# Patient Record
Sex: FEMALE | Race: WHITE | Employment: OTHER | URBAN - METROPOLITAN AREA
[De-identification: names, ages, dates, MRNs, and addresses within clinical notes are randomized per-mention and may not be internally consistent; named-entity substitution may affect disease eponyms.]

---

## 2015-10-26 DEVICE — PORT INFUS 8FR PLAS ATTCH OPN END POLYUR CATH SIL FILL SUT
Type: IMPLANTABLE DEVICE | Site: CHEST | Status: NON-FUNCTIONAL
Removed: 2019-09-17

## 2017-01-17 ENCOUNTER — OFFICE VISIT (OUTPATIENT)
Dept: ONCOLOGY | Age: 72
End: 2017-01-17

## 2017-01-17 ENCOUNTER — HOSPITAL ENCOUNTER (OUTPATIENT)
Dept: LAB | Age: 72
Discharge: HOME | End: 2017-01-17
Payer: MEDICARE

## 2017-01-17 ENCOUNTER — DOCUMENTATION ONLY (OUTPATIENT)
Dept: ONCOLOGY | Age: 72
End: 2017-01-17

## 2017-01-17 VITALS
WEIGHT: 208 LBS | SYSTOLIC BLOOD PRESSURE: 155 MMHG | TEMPERATURE: 96.1 F | BODY MASS INDEX: 33.43 KG/M2 | HEART RATE: 86 BPM | OXYGEN SATURATION: 95 % | RESPIRATION RATE: 18 BRPM | DIASTOLIC BLOOD PRESSURE: 75 MMHG | HEIGHT: 66 IN

## 2017-01-17 DIAGNOSIS — C56.9 OVARIAN CANCER, UNSPECIFIED LATERALITY (HCC): Primary | ICD-10-CM

## 2017-01-17 DIAGNOSIS — C56.9 OVARIAN CANCER, UNSPECIFIED LATERALITY (HCC): ICD-10-CM

## 2017-01-17 LAB
ALBUMIN SERPL BCP-MCNC: 2.8 G/DL (ref 3.4–5)
ALBUMIN/GLOB SERPL: 0.8 {RATIO} (ref 0.8–1.7)
ALP SERPL-CCNC: 108 U/L (ref 45–117)
ALT SERPL-CCNC: 19 U/L (ref 13–56)
ANION GAP BLD CALC-SCNC: 11 MMOL/L (ref 3–18)
AST SERPL W P-5'-P-CCNC: 14 U/L (ref 15–37)
BASOPHILS # BLD AUTO: 0 K/UL (ref 0–0.06)
BASOPHILS # BLD: 0 % (ref 0–2)
BILIRUB SERPL-MCNC: 0.2 MG/DL (ref 0.2–1)
BUN SERPL-MCNC: 14 MG/DL (ref 7–18)
BUN/CREAT SERPL: 16 (ref 12–20)
CALCIUM SERPL-MCNC: 8.7 MG/DL (ref 8.5–10.1)
CHLORIDE SERPL-SCNC: 101 MMOL/L (ref 100–108)
CO2 SERPL-SCNC: 27 MMOL/L (ref 21–32)
CREAT SERPL-MCNC: 0.9 MG/DL (ref 0.6–1.3)
DIFFERENTIAL METHOD BLD: ABNORMAL
EOSINOPHIL # BLD: 0.1 K/UL (ref 0–0.4)
EOSINOPHIL NFR BLD: 1 % (ref 0–5)
ERYTHROCYTE [DISTWIDTH] IN BLOOD BY AUTOMATED COUNT: 14.2 % (ref 11.6–14.5)
GLOBULIN SER CALC-MCNC: 3.3 G/DL (ref 2–4)
GLUCOSE SERPL-MCNC: 93 MG/DL (ref 74–99)
HCT VFR BLD AUTO: 31.5 % (ref 35–45)
HGB BLD-MCNC: 10 G/DL (ref 12–16)
LYMPHOCYTES # BLD AUTO: 10 % (ref 21–52)
LYMPHOCYTES # BLD: 1 K/UL (ref 0.9–3.6)
MCH RBC QN AUTO: 27.3 PG (ref 24–34)
MCHC RBC AUTO-ENTMCNC: 31.7 G/DL (ref 31–37)
MCV RBC AUTO: 86.1 FL (ref 74–97)
MONOCYTES # BLD: 0.6 K/UL (ref 0.05–1.2)
MONOCYTES NFR BLD AUTO: 7 % (ref 3–10)
NEUTS SEG # BLD: 8 K/UL (ref 1.8–8)
NEUTS SEG NFR BLD AUTO: 82 % (ref 40–73)
PLATELET # BLD AUTO: 629 K/UL (ref 135–420)
PMV BLD AUTO: 9 FL (ref 9.2–11.8)
POTASSIUM SERPL-SCNC: 4.5 MMOL/L (ref 3.5–5.5)
PROT SERPL-MCNC: 6.1 G/DL (ref 6.4–8.2)
RBC # BLD AUTO: 3.66 M/UL (ref 4.2–5.3)
SODIUM SERPL-SCNC: 139 MMOL/L (ref 136–145)
WBC # BLD AUTO: 9.7 K/UL (ref 4.6–13.2)

## 2017-01-17 PROCEDURE — 80053 COMPREHEN METABOLIC PANEL: CPT | Performed by: OBSTETRICS & GYNECOLOGY

## 2017-01-17 PROCEDURE — 85025 COMPLETE CBC W/AUTO DIFF WBC: CPT | Performed by: OBSTETRICS & GYNECOLOGY

## 2017-01-17 PROCEDURE — 86304 IMMUNOASSAY TUMOR CA 125: CPT | Performed by: OBSTETRICS & GYNECOLOGY

## 2017-01-17 PROCEDURE — 36415 COLL VENOUS BLD VENIPUNCTURE: CPT | Performed by: OBSTETRICS & GYNECOLOGY

## 2017-01-17 NOTE — PROGRESS NOTES
[facility]  [address] [place] [address], [address] [facility], [address] [place], [place] [address] [phone]        Patient ID:  Name: Khalif Demarco  MRM: [id number]  : 1945/71 y.o. Date: 2017    SUBJECTIVE:  This is a 78 y.o.   female with Stage IIB, Grade 2 Endometrioid Ovarian Cancer s/p adjuvant chemotherapy with Carboplatin and Taxol s/p 6 cycles. Patient is here today for a 3 month f/u. Patient denies any Gyn symptoms. Patient specifically denies any abnormal vaginal bleeding, vaginal discharge, or vaginal irritation. Patient also denies abdominal pain, pelvic pain, or abdominal bloating. Patient is voiding without difficulty and bowel movements are regular. Patient is status post diagnostic laparoscopy converted to exploratory laparotomy, total abdominal hysterectomy, bilateral salpingo-oophorectomy, radical resection of pelvic mass with gastrocolic omentectomy, bilateral pelvic and periaortic lymph node dissection, optimal debulking of pelvic tumor and washings 2015. Post op course was complicated by necrotizing wound infection requiring debridement x 2 in the operating room. Patient has had a breast biopsy c/w hyaline cartilage favor chondroma.      Pt has been having night sweats, weight loss, loss of appetite over last month but recently has been feeling better      Labs:  Component      Latest Ref Rng & Units 2016          10:00 AM   Cancer Ag (CA) 125      0.0 - 38.1 U/mL 29.3     Component      Latest Ref Rng 10/4/2016          11:20 AM   Cancer Ag (CA) 125      0.0 - 38.1 U/mL 19.1     Component       Cancer Ag (CA) 125   Latest Ref Rng       0.0 - 38.1 U/mL   2016      11:00 AM 18.6   2016      11:15 AM 19.5     Component       Cancer Ag (CA) 125   Latest Ref Rng       0.0 - 34.0 U/mL   2016      8:37 AM 18.5     Component       Cancer Ag (CA) 125   Latest Ref Rng       0.0 - 34.0 U/mL   2016      8:15 AM 19.0   1/6/2016      8:20 AM 17.9   12/16/2015      8:35 AM 17.1   11/25/2015      8:20 AM 15.9   11/4/2015      8:25 AM 11.8         Infusion:  Chemotherapy Flowsheet 2/23/2016 2/2/2016 1/6/2016   Cycle C6 D1 C5D1 C4D1   Date 2/23/2016 2/2/2016 1/6/2016   Drug / Regimen Taxol/Carboplatin Taxol/Carbo Taxol/Carbp   Dosage 260 mg/333 mg see mar see mar   Pre Hydration  ml 250 250   Post Hydration  250 250   Pre Meds see mar see mar see mar   Notes ninoska well       Chemotherapy Flowsheet 12/16/2015 11/25/2015 11/4/2015   Cycle C3D1 C2D1 C1D1   Date 12/16/2015 11/25/2015 11/4/2015   Drug / Regimen Taxol/ Carbo Taxol/ Carbo Taxol/Carbo   Dosage See MAR  [phone]   Pre Meds   see mar         Imaging:    US TRANSVAGINAL: 12/30/16    Impression   Impression:        1. The patient is status post hysterectomy and bilateral oophorectomy.       2. There is a large (approximately 9.5 x 6.6 x 6.6 cm) cystic mass in the right adnexal region. No blood flow is identified within this finding.       Differential diagnostic considerations include a recurrent ovarian neoplasm, a postoperative seroma, or lymphocele. Mild anterolateral mural nodularity is questioned along the anterior aspect of this lesion on the prior CT scan. Further evaluation with pre-and postcontrast MR imaging of the pelvis should be considered.       A copy of this report will be faxed to the ordering clinician's office and the office staff will be notified of the incoming fax by telephone. POSITRON EMISSION TOMOGRAPHY/COMPUTED TOMOGRAPHY:         INDICATION: Ovarian carcinoma.    Subsequent treatment strategy.          RADIOPHARMACEUTICAL: 14.4 mCi F-18 FDG i.v.   Right antecubital injection site.     SERUM GLUCOSE: 91 mg/dL.          COMPARISON PET/CT: None.       CORRELATIVE ANATOMIC IMAGING: CT chest, abdomen, pelvis 3/14/2016.        TECHNIQUE: Initially, F-18 FDG was administered intravenously.  After  approximately 00-76 minutes, helically acquired CT was obtained from the skull  base to mid thighs with subsequent PET images, both obtained during normal tidal  respiration. The PET and CT images were reconstructed in the axial, coronal and  sagittal planes and viewed in a co-registered fashion. CT images are not  performed using standard diagnostic CT protocol, limited by lack of intravenous  iodinated contrast, reduced mAs and kVp (x-rays), and acquisition during normal  tidal respiratory motion (to improve fusion with PET images, however, could make  size assessment of abnormalities less accurate).           -----PET FINDINGS-----        Reference value mediastinal blood pool SUV (max) =  3.0.  -Reference value liver parenchyma SUV (max) =  4.5.     Skull base/Neck: No evidence of malignant glucose activity.          Chest: There is mild FDG uptake within a 1.0 x 1.9 cm left axillary lymph node;  SUV (max) =  2.8. This lymph node has a prominent fatty hilum. The significance  of this finding is unclear. This may represent reactive adenopathy although  metastatic disease cannot be excluded with certainty.     There are no other foci of malignant FDG uptake in the chest.         Abdomen/Pelvis: There is mild FDG uptake within a 1.1 cm diameter left  retroperitoneal lymph node; SUV (max) =  2.3 potentially representing metastatic  disease.     There are no other foci of malignant FDG uptake in the abdomen or pelvis. There  is no evidence of elevated FDG uptake within the 6.1 x 2.2 cm cystic lesion  previously noted in the right hemipelvis.        Osseous structures: Bone scintigraphy is more sensitive for detection of  osteoblastic metastatic disease and PET more sensitive for osteolytic lesions,  suggesting the imaging modalities should be regarded as complementary, and  should not replace each other. Otherwise, no evidence of malignant glucose  activity.          -----ADDITIONAL CT FINDINGS-----     Exam is limited due to lack of intravenous contrast.         CT Skull base/Neck: No significant focal abnormality on this noncontrast study.     No enlarged adenopathy.     CT Chest: Abnormalities are detailed above. Otherwise no significant interval  change since the recent CT dated 3/14/2016         CT Abdomen/Pelvis: No new finding since the recent CT dated 3/14/2016.     CT Osseous structures: No significant focal abnormality.        Impression:      1. Mild FDG uptake within a small left retroperitoneal lymph node potentially  representing metastatic neoplasm. 2. Mild nonspecific FDG uptake within a left axillary lymph node. 3. No other foci of malignant FDG uptake      Her pathology revealed: 8/12/15  A: LYMPH NODE, RIGHT PELVIC, BIOPSY:  SMALL LYMPHOCYTIC LYMPHOMA (CHRONIC LYMPHOCYTIC LEUKEMIA). NEGATIVE FOR METASTATIC ADENOCARCINOMA. B: RIGHT PELVIC MASS: ADENOCARCINOMA. SPECIMEN: RIGHT OVARY. PROCEDURE: RIGHT OOPHORECTOMY. LYMPH NODE SAMPLING: PERFORMED. SPECIMEN INTEGRITY: RIGHT OVARY CAPSULE RUPTURED. PRIMARY TUMOR SITE: RIGHT OVARY. OVARIAN SURFACE INVOLVEMENT: ABSENT.  TUMOR SIZE: 11.0 CM GREATEST DIMENSION. HISTOLOGIC TYPE: ENDOMETRIOID CARCINOMA. HISTOLOGIC GRADE: MODERATELY DIFFERENTIATED (G2). IMPLANTS: N/A  EXTENT OF INVOLVEMENT OF OTHER TISSUE/ORGANS: NONE IDENTIFIED. LYMPH/VASCULAR INVASION: PRESENT. PATHOLOGIC STAGE: pT1c, N0, M0 (FIGO IC). NUMBER OF LYMPH NODES EXAMINED: 19.  NUMBER OF LYMPH NODES INVOLVED: 0.  C: RIGHT PELVIC LYMPH NODE:  SMALL LYMPHOCYTIC LYMPHOMA. NEGATIVE FOR METASTATIC ADENOCARCINOMA. D: LEFT SALPINGO-OOPHORECTOMY:  ENDOMETRIOSIS AND ADHESIONS. NEGATIVE FOR MALIGNANCY. E: PELVIC MASS, BIOPSY:  BENIGN ADHESIONS AND PERITONEAL TISSUE. NEGATIVE FOR MALIGNANCY. F: TOTAL HYSTERECTOMY:  CERVIX: NO ABNORMALITY SEEN.  ENDOMETRIUM: ATROPHIC. MYOMETRIUM: SUBMUCOSAL AND INTRAMURAL LEIOMYOMAS. SEROSA: ENDOMETRIOSIS WITH ADHESIONS. NEGATIVE FOR MALIGNANCY.   G: OMENTUM, RESECTION:  FOAMY MACROPHAGES AND HISTIOCYTES. NEGATIVE FOR MALIGNANCY. H: LYMPH NODES, RIGHT PARA-AORTIC, DISSECTION:  SMALL LYMPHOCYTIC LYMPHOMA. NEGATIVE FOR METASTATIC ADENOCARCINOMA (FOUR NODES). I: LYMPH NODES, LEFT PARA-AORTIC, DISSECTION:  SMALL LYMPHOCYTIC LYMPHOMA. NEGATIVE FOR METASTATIC ADENOCARCINOMA (TWO NODES). J: LYMPH NODES, LEFT PELVIC, DISSECTION:  SMALL LYMHOCYTIC LYMPHOMA. NEGATIVE FOR METASTATIC ADENOCARCINOMA (FIVE NODES). K: LYMPH NODES, RIGHT PELVIC, DISSECTION:    SMALL LYMPHOCYTIC LYMPHOMA. NEGATIVE FOR METASTATIC ADENOCARCINOMA (SIX NODES). Medications:     Current Outpatient Prescriptions on File Prior to Visit   Medication Sig Dispense Refill    pyridoxine, vitamin B6, (VITAMIN B-6) 100 mg tablet Take 100 mg by mouth daily.  cyanocobalamin 1,000 mcg tablet Take 500 mcg by mouth daily.  lidocaine-prilocaine (EMLA) topical cream Apply 1 g to affected area as needed for Pain. Place a dime amount over port site 1 hour prior to having port accessed. Cover with saran wrap. 30 g 3    naproxen (NAPROSYN) 500 mg tablet Take 500 mg by mouth two (2) times daily as needed.  levothyroxine (SYNTHROID) 175 mcg tablet Take 175 mcg by mouth Daily (before breakfast). Take dos  Indications: HYPOTHYROIDISM      acetaminophen (TYLENOL) 325 mg tablet Take 650 mg by mouth every four (4) hours as needed for Pain. No current facility-administered medications on file prior to visit. Allergies:      Allergies   Allergen Reactions    Other Plant, Animal, Environmental Swelling     Surgical steel earrings, and surgical clamps caused swelling and / or infection    Penicillins Other (comments)     Psychological reaction       OBJECTIVE:    Vitals:   Visit Vitals    /75 (BP 1 Location: Right arm, BP Patient Position: Sitting)    Pulse 86    Temp 96.1 °F (35.6 °C) (Oral)    Resp 18    Ht 5' 5.5\" (1.664 m)    Wt 94.3 kg (208 lb)    SpO2 95%    BMI 34.09 kg/m2       Physical Examination:  Physical Exam  VITAL SIGNS: Visit Vitals    /75 (BP 1 Location: Right arm, BP Patient Position: Sitting)    Pulse 86    Temp 96.1 °F (35.6 °C) (Oral)    Resp 18    Ht 5' 5.5\" (1.664 m)    Wt 94.3 kg (208 lb)    SpO2 95%    BMI 34.09 kg/m2          GENERAL KRISTINE: in no apparent distress and well developed and well nourished   Breast deferred     MUSCULOSKEL: no joint tenderness, deformity or swelling   INTEGUMENT:  warm and dry, no rashes or lesions   ABDOMEN . soft, NT, ND, No masses appreciated   EXTREMITIES: extremities normal, atraumatic, no cyanosis or edema   PELVIC: deferred   RECTAL: deferred   MANUEL SURVEY: Cervical, supraclavicular, axillary and inguinal nodes normal.   NEURO: Grossly normal         IMPRESSION/PLAN:  Stage IIB G2 endometrioid ovarian cancer who is clinically NGUYEN  s/p adjuvant Carboplatin and Taxol status post 6 cycles  Postoperative course complicated by necrotizing wound infection s/p debridement x2 now healed well. Reviewed CT and ultrasound c/w lymphocyst and is stable in size   done at [facility]: 38; will repeat today and if continues to be elevated may consider repeat PETCT  Will recheck labs today and call  Health maintenance- explained that breast exam does not replace need for mammogram; encourage her to call PCP to set up  Reviewed surveillance strategy at this point with exams,  every 3 months  Port flush every 6 weeks   Chronic lymphocytic lymphoma. Has f/u on Thursday with Dr. Catherine Oconnell at [facility]     Total time spent was 40 minutes, >50% of this time was spent counseling regarding clinical findings, chemotherapy associated symptoms and management and coordination of care.      Jeovanny Perry MD  Gynecologic Oncology  1/17/2017/1:52 PM

## 2017-01-17 NOTE — MR AVS SNAPSHOT
Visit Information Date & Time Provider Department Dept. Phone Encounter #  
 1/17/2017 11:45 AM MD lCaudia Neil [facility] [phone] [id number] Your Appointments 4/18/2017  1:45 PM  
Office Visit with MD Claudia Neil [facility]) Appt Note: 3mo f/u  
 [address] Upcoming Health Maintenance Date Due DTaP/Tdap/Td series (1 - Tdap) 12/27/1965 FOBT Q 1 YEAR AGE 50-75 12/27/1994 ZOSTER VACCINE AGE 60> 12/27/2004 GLAUCOMA SCREENING Q2Y 12/27/2009 OSTEOPOROSIS SCREENING (DEXA) 12/27/2009 MEDICARE YEARLY EXAM 12/27/2009 INFLUENZA AGE 9 TO ADULT 8/1/2016 Pneumococcal 65+ High/Highest Risk (2 of 2 - PCV13) 8/17/2016 BREAST CANCER SCRN MAMMOGRAM 10/7/2017 Allergies as of 1/17/2017  Review Complete On: 1/17/2017 By: Margy Carias MD  
  
 Severity Noted Reaction Type Reactions Other Plant, Animal, Environmental  10/01/2015   Side Effect Swelling Surgical steel earrings, and surgical clamps caused swelling and / or infection Penicillins  08/07/2015    Other (comments) Psychological reaction Current Immunizations  Reviewed on 10/4/2016 Name Date Influenza Vaccine 10/26/2015 Pneumococcal Polysaccharide (PPSV-23) 8/17/2015 11:11 AM  
  
 Not reviewed this visit You Were Diagnosed With   
  
 Codes Comments [facility]varian cancer, unspecified [facility]ality [facility]    -  Primary ICD-10-CM: C56.9 ICD-9-CM: 183. 0 Vitals BP Pulse Temp Resp Height(growth percentile) Weight(growth percentile) 155/75 (BP 1 Location: Right arm, BP Patient Position: Sitting) 86 96.1 °F (35.6 °C) (Oral) 18 5' 5.5\" (1.664 m) 208 lb (94.3 kg) SpO2 BMI OB Status Smoking Status 95% 34.09 kg/m2 Hysterectomy Passive Smoke Exposure - Never Smoker Vitals History BMI and BSA Data Body Mass Index Body Surface Area 34.09 kg/m 2 2.09 m 2 Preferred Pharmacy Pharmacy Name Phone [facility] [address] [place] [address] Emma [phone] Your Updated Medication List  
  
   
This list is accurate as of: 1/17/17 12:29 PM.  Always use your most recent med list.  
  
  
  
  
 cyanocobalamin 1,000 mcg tablet Take 500 mcg by mouth daily. lidocaine-prilocaine topical cream  
Commonly known as:  EMLA Apply 1 g to affected area as needed for Pain. Place a dime amount over port site 1 hour prior to having port accessed. Cover with saran wrap. NAPROSYN 500 mg tablet Generic drug:  naproxen Take 500 mg by mouth two (2) times daily as needed. pyridoxine (vitamin B6) 100 mg tablet Commonly known as:  VITAMIN B-6 Take 100 mg by mouth daily. SYNTHROID 175 mcg tablet Generic drug:  levothyroxine Take 175 mcg by mouth Daily (before breakfast). Take dos  Indications: HYPOTHYROIDISM TYLENOL 325 mg tablet Generic drug:  acetaminophen Take 650 mg by mouth every four (4) hours as needed for Pain. To-Do List   
 01/17/2017 Lab:  CANCER ANTIGEN 125   
  
 01/17/2017 Lab:  CBC WITH AUTOMATED DIFF   
  
 01/17/2017 Lab:  METABOLIC PANEL, COMPREHENSIVE   
  
 01/31/2017 10:30 AM  
  Appointment with [facility] FAST TRACK NURSE at [facility] [address] [facility] ([phone]) Introducing [facility]! Thony Herrera introduces [employer] patient portal. Now you can access parts of your medical record, email your doctor's office, and request medication refills online. 1. In your internet browser, go to [email or web address] 2. Click on the First Time User? Click Here link in the Sign In box. You will see the New Member Sign Up page. 3. Enter your [employer] Access Code exactly as it appears below. You will not need to use this code after youve completed the sign-up process. If you do not sign up before the expiration date, you must request a new code. · [employer] Access Code: [email or web address] Expires: 4/17/2017 12:29 PM 
 
4. Enter the last four digits of your Social Security Number (xxxx) and Date of Birth (mm/dd/yyyy) as indicated and click Submit. You will be taken to the next sign-up page. 5. Create a [employer] ID. This will be your [employer] login ID and cannot be changed, so think of one that is secure and easy to remember. 6. Create a [employer] password. You can change your password at any time. 7. Enter your Password Reset Question and Answer. This can be used at a later time if you forget your password. 8. Enter your e-mail address. You will receive e-mail notification when new information is available in [address] 9. Click Sign Up. You can now view and download portions of your medical record. 10. Click the Download Summary menu link to download a portable copy of your medical information. If you have questions, please visit the Frequently Asked Questions section of the [employer] website. Remember, [employer] is NOT to be used for urgent needs. For medical emergencies, dial 911. Now available from your iPhone and Android! Please provide this summary of care documentation to your next provider. Your primary care clinician is listed as Darvin Campbell. If you have any questions after today's visit, please call [phone].

## 2017-01-18 LAB — CANCER AG125 SERPL-ACNC: 36.5 U/ML (ref 0–38.1)

## 2017-01-31 ENCOUNTER — HOSPITAL ENCOUNTER (OUTPATIENT)
Dept: INFUSION THERAPY | Age: 72
Discharge: HOME | End: 2017-01-31
Payer: MEDICARE

## 2017-01-31 VITALS
SYSTOLIC BLOOD PRESSURE: 134 MMHG | RESPIRATION RATE: 18 BRPM | HEART RATE: 71 BPM | OXYGEN SATURATION: 96 % | DIASTOLIC BLOOD PRESSURE: 63 MMHG | TEMPERATURE: 97 F

## 2017-01-31 PROCEDURE — 77030012965 HC NDL HUBR BBMI -A

## 2017-01-31 PROCEDURE — 74011250636 HC RX REV CODE- 250/636: Performed by: OBSTETRICS & GYNECOLOGY

## 2017-01-31 PROCEDURE — 96523 IRRIG DRUG DELIVERY DEVICE: CPT

## 2017-01-31 RX ORDER — HEPARIN 100 UNIT/ML
500 SYRINGE INTRAVENOUS AS NEEDED
Status: DISCONTINUED | OUTPATIENT
Start: 2017-01-31 | End: 2017-02-04 | Stop reason: HOSPADM

## 2017-01-31 RX ORDER — SODIUM CHLORIDE 0.9 % (FLUSH) 0.9 %
10-40 SYRINGE (ML) INJECTION AS NEEDED
Status: DISCONTINUED | OUTPATIENT
Start: 2017-01-31 | End: 2017-02-04 | Stop reason: HOSPADM

## 2017-01-31 RX ADMIN — Medication 500 UNITS: at 11:00

## 2017-01-31 RX ADMIN — Medication 20 ML: at 11:00

## 2017-02-07 ENCOUNTER — APPOINTMENT (OUTPATIENT)
Dept: INFUSION THERAPY | Age: 72
End: 2017-02-07
Payer: MEDICARE

## 2017-03-14 ENCOUNTER — HOSPITAL ENCOUNTER (OUTPATIENT)
Dept: INFUSION THERAPY | Age: 72
Discharge: HOME | End: 2017-03-14
Payer: MEDICARE

## 2017-03-14 VITALS
WEIGHT: 218.4 LBS | RESPIRATION RATE: 18 BRPM | BODY MASS INDEX: 35.79 KG/M2 | SYSTOLIC BLOOD PRESSURE: 146 MMHG | OXYGEN SATURATION: 98 % | TEMPERATURE: 97.5 F | DIASTOLIC BLOOD PRESSURE: 62 MMHG | HEART RATE: 80 BPM

## 2017-03-14 LAB
ALBUMIN SERPL BCP-MCNC: 4 G/DL (ref 3.4–5)
ALBUMIN/GLOB SERPL: 1.3 {RATIO} (ref 0.8–1.7)
ALP SERPL-CCNC: 116 U/L (ref 45–117)
ALT SERPL-CCNC: 24 U/L (ref 13–56)
ANION GAP BLD CALC-SCNC: 7 MMOL/L (ref 3–18)
AST SERPL W P-5'-P-CCNC: 18 U/L (ref 15–37)
BASOPHILS # BLD AUTO: 0 K/UL (ref 0–0.06)
BASOPHILS # BLD: 0 % (ref 0–2)
BILIRUB SERPL-MCNC: 0.3 MG/DL (ref 0.2–1)
BUN SERPL-MCNC: 14 MG/DL (ref 7–18)
BUN/CREAT SERPL: 18 (ref 12–20)
CALCIUM SERPL-MCNC: 9.1 MG/DL (ref 8.5–10.1)
CHLORIDE SERPL-SCNC: 104 MMOL/L (ref 100–108)
CO2 SERPL-SCNC: 29 MMOL/L (ref 21–32)
CREAT SERPL-MCNC: 0.79 MG/DL (ref 0.6–1.3)
DIFFERENTIAL METHOD BLD: ABNORMAL
EOSINOPHIL # BLD: 0.3 K/UL (ref 0–0.4)
EOSINOPHIL NFR BLD: 4 % (ref 0–5)
ERYTHROCYTE [DISTWIDTH] IN BLOOD BY AUTOMATED COUNT: 16.1 % (ref 11.6–14.5)
GLOBULIN SER CALC-MCNC: 3 G/DL (ref 2–4)
GLUCOSE SERPL-MCNC: 83 MG/DL (ref 74–99)
HCT VFR BLD AUTO: 36 % (ref 35–45)
HGB BLD-MCNC: 11.5 G/DL (ref 12–16)
LYMPHOCYTES # BLD AUTO: 16 % (ref 21–52)
LYMPHOCYTES # BLD: 1.3 K/UL (ref 0.9–3.6)
MAGNESIUM SERPL-MCNC: 1.5 MG/DL (ref 1.8–2.4)
MCH RBC QN AUTO: 27.6 PG (ref 24–34)
MCHC RBC AUTO-ENTMCNC: 31.9 G/DL (ref 31–37)
MCV RBC AUTO: 86.3 FL (ref 74–97)
MONOCYTES # BLD: 0.7 K/UL (ref 0.05–1.2)
MONOCYTES NFR BLD AUTO: 8 % (ref 3–10)
NEUTS SEG # BLD: 5.8 K/UL (ref 1.8–8)
NEUTS SEG NFR BLD AUTO: 72 % (ref 40–73)
PLATELET # BLD AUTO: 374 K/UL (ref 135–420)
PMV BLD AUTO: 9.4 FL (ref 9.2–11.8)
POTASSIUM SERPL-SCNC: 4.1 MMOL/L (ref 3.5–5.5)
PROT SERPL-MCNC: 7 G/DL (ref 6.4–8.2)
RBC # BLD AUTO: 4.17 M/UL (ref 4.2–5.3)
SODIUM SERPL-SCNC: 140 MMOL/L (ref 136–145)
WBC # BLD AUTO: 8.1 K/UL (ref 4.6–13.2)

## 2017-03-14 PROCEDURE — 80053 COMPREHEN METABOLIC PANEL: CPT | Performed by: OBSTETRICS & GYNECOLOGY

## 2017-03-14 PROCEDURE — 74011250636 HC RX REV CODE- 250/636: Performed by: OBSTETRICS & GYNECOLOGY

## 2017-03-14 PROCEDURE — 86304 IMMUNOASSAY TUMOR CA 125: CPT | Performed by: OBSTETRICS & GYNECOLOGY

## 2017-03-14 PROCEDURE — 36591 DRAW BLOOD OFF VENOUS DEVICE: CPT

## 2017-03-14 PROCEDURE — 85025 COMPLETE CBC W/AUTO DIFF WBC: CPT | Performed by: OBSTETRICS & GYNECOLOGY

## 2017-03-14 PROCEDURE — 83735 ASSAY OF MAGNESIUM: CPT | Performed by: OBSTETRICS & GYNECOLOGY

## 2017-03-14 PROCEDURE — 77030012965 HC NDL HUBR BBMI -A

## 2017-03-14 RX ORDER — SODIUM CHLORIDE 0.9 % (FLUSH) 0.9 %
10-40 SYRINGE (ML) INJECTION AS NEEDED
Status: DISCONTINUED | OUTPATIENT
Start: 2017-03-14 | End: 2017-03-18 | Stop reason: HOSPADM

## 2017-03-14 RX ORDER — HEPARIN 100 UNIT/ML
500 SYRINGE INTRAVENOUS ONCE
Status: COMPLETED | OUTPATIENT
Start: 2017-03-14 | End: 2017-03-14

## 2017-03-14 RX ADMIN — Medication 500 UNITS: at 10:52

## 2017-03-14 RX ADMIN — Medication 30 ML: at 10:52

## 2017-03-14 NOTE — PROGRESS NOTES
[facility] Progress Note    Date: 2017    Name: Billy Vela    MRN: [id number]         : 1945    Q 6 weeks Port flush & Lab Draw    Ms. Nanci Norwood to [facility], ambulatory with cane, at 1030 accompanied by her . Pt was assessed and education was provided. She states she has been doing well and denies complaints today. Ms. Katie Quinones vitals were reviewed. Visit Vitals    /62 (BP 1 Location: Right arm, BP Patient Position: Sitting)    Pulse 80    Temp 97.5 °F (36.4 °C)    Resp 18    Wt 99.1 kg (218 lb 6.4 oz)    SpO2 98%    Breastfeeding No    BMI 35.79 kg/m2       Left chest mediport was accessed with 20 gauge 3/4 inch العراقي(s) after chloroprep. Port flushed easily and had brisk blood return. Blood drawn off and sent to lab for CBC, CMP, Mg and CA-125 after 10 ml waste per written orders. Recent Results (from the past 12 hour(s))   CBC WITH AUTOMATED DIFF    Collection Time: 17 10:52 AM   Result Value Ref Range    WBC 8.1 4.6 - 13.2 K/uL    RBC 4.17 (L) 4.20 - 5.30 M/uL    HGB 11.5 (L) 12.0 - 16.0 g/dL    HCT 36.0 35.0 - 45.0 %    MCV 86.3 74.0 - 97.0 FL    MCH 27.6 24.0 - 34.0 PG    MCHC 31.9 31.0 - 37.0 g/dL    RDW 16.1 (H) 11.6 - 14.5 %    PLATELET 487 888 - 037 K/uL    MPV 9.4 9.2 - 11.8 FL    NEUTROPHILS 72 40 - 73 %    LYMPHOCYTES 16 (L) 21 - 52 %    MONOCYTES 8 3 - 10 %    EOSINOPHILS 4 0 - 5 %    BASOPHILS 0 0 - 2 %    ABS. NEUTROPHILS 5.8 1.8 - 8.0 K/UL    ABS. LYMPHOCYTES 1.3 0.9 - 3.6 K/UL    ABS. MONOCYTES 0.7 0.05 - 1.2 K/UL    ABS. EOSINOPHILS 0.3 0.0 - 0.4 K/UL    ABS.  BASOPHILS 0.0 0.0 - 0.06 K/UL    DF AUTOMATED     MAGNESIUM    Collection Time: 17 10:52 AM   Result Value Ref Range    Magnesium 1.5 (L) 1.8 - 2.4 mg/dL   METABOLIC PANEL, COMPREHENSIVE    Collection Time: 17 10:52 AM   Result Value Ref Range    Sodium 140 136 - 145 mmol/L    Potassium 4.1 3.5 - 5.5 mmol/L    Chloride 104 100 - 108 mmol/L    CO2 29 21 - 32 mmol/L    Anion gap 7 3.0 - 18 mmol/L    Glucose 83 74 - 99 mg/dL    BUN 14 7.0 - 18 MG/DL    Creatinine 0.79 0.6 - 1.3 MG/DL    BUN/Creatinine ratio 18 12 - 20      GFR est AA >60 >60 ml/min/1.73m2    GFR est non-AA >60 >60 ml/min/1.73m2    Calcium 9.1 8.5 - 10.1 MG/DL    Bilirubin, total 0.3 0.2 - 1.0 MG/DL    ALT (SGPT) 24 13 - 56 U/L    AST (SGOT) 18 15 - 37 U/L    Alk. phosphatase 116 45 - 117 U/L    Protein, total 7.0 6.4 - 8.2 g/dL    Albumin 4.0 3.4 - 5.0 g/dL    Globulin 3.0 2.0 - 4.0 g/dL    A-G Ratio 1.3 0.8 - 1.7         Mediport flushed with NS 20 ml and Heparin 500 units then de-accessed. No irritation or bleeding noted. Band-Aid applied. Ms. Jaja Márquez tolerated the procedure, and had no complaints. Patient armband removed and shredded. Ms. Jaja Márquez was discharged from [place] [address] in stable condition at 1055. She is to return on 4/25/17 at 56 for her next appointment.     Mariya Tena RN  March 14, 2017

## 2017-03-15 ENCOUNTER — TELEPHONE (OUTPATIENT)
Dept: ONCOLOGY | Age: 72
End: 2017-03-15

## 2017-03-15 LAB — CANCER AG125 SERPL-ACNC: 18.3 U/ML (ref 0–38.1)

## 2017-03-15 NOTE — TELEPHONE ENCOUNTER
Called to review recent lab results. All WNL, except mag 1.5. Patient feeling well. Denies complaints. All questions answered, patient demonstrates understanding and denies additional questions or concerns. Advised to call office with any questions or concerns before next appointment.

## 2017-04-18 ENCOUNTER — OFFICE VISIT (OUTPATIENT)
Dept: ONCOLOGY | Age: 72
End: 2017-04-18

## 2017-04-18 VITALS
SYSTOLIC BLOOD PRESSURE: 151 MMHG | WEIGHT: 219.8 LBS | RESPIRATION RATE: 16 BRPM | HEART RATE: 76 BPM | OXYGEN SATURATION: 97 % | TEMPERATURE: 96.2 F | BODY MASS INDEX: 36.02 KG/M2 | DIASTOLIC BLOOD PRESSURE: 71 MMHG

## 2017-04-18 DIAGNOSIS — C56.9 OVARIAN CANCER, UNSPECIFIED LATERALITY (HCC): Primary | ICD-10-CM

## 2017-04-18 NOTE — PROGRESS NOTES
[facility]  [address], [address] [facility]   [phone]        Patient ID:  Name: Sushma Antunez  MRM: [id number]  : 1945/71 y.o. Date: 2017    SUBJECTIVE:  This is a 78 y.o.   female with Stage IIB, Grade 2 Endometrioid Ovarian Cancer s/p adjuvant chemotherapy with Carboplatin and Taxol s/p 6 cycles. Patient is here today for a 3 month f/u. Patient denies any Gyn symptoms. Patient specifically denies any abnormal vaginal bleeding, vaginal discharge, or vaginal irritation. Patient also denies abdominal pain, pelvic pain, or abdominal bloating. Patient is voiding without difficulty and bowel movements are regular. Patient is status post diagnostic laparoscopy converted to exploratory laparotomy, total abdominal hysterectomy, bilateral salpingo-oophorectomy, radical resection of pelvic mass with gastrocolic omentectomy, bilateral pelvic and periaortic lymph node dissection, optimal debulking of pelvic tumor and washings 2015. Post op course was complicated by necrotizing wound infection requiring debridement x 2 in the operating room. Patient has had a breast biopsy c/w hyaline cartilage favor chondroma. Pt is still having a little numbness on bottom of feet. Denies N/V, abdominopelvic pain, change in bladder or bowel.        Labs:  Component      Latest Ref Rng & Units 3/14/2017          10:52 AM   Cancer Ag (CA) 125      0.0 - 38.1 U/mL 18.3     Component      Latest Ref Rng & Units 2016          10:00 AM   Cancer Ag (CA) 125      0.0 - 38.1 U/mL 29.3     Component      Latest Ref Rng 10/4/2016          11:20 AM   Cancer Ag (CA) 125      0.0 - 38.1 U/mL 19.1     Component       Cancer Ag (CA) 125   Latest Ref Rng       0.0 - 38.1 U/mL   2016      11:00 AM 18.6   2016      11:15 AM 19.5     Component       Cancer Ag (CA) 125   Latest Ref Rng       0.0 - 34.0 U/mL   2016 8: 37 AM 18.5     Component       Cancer Ag (CA) 125   Latest Ref Rng       0.0 - 34.0 U/mL   2/2/2016      8:15 AM 19.0   1/6/2016      8:20 AM 17.9   12/16/2015      8:35 AM 17.1   11/25/2015      8:20 AM 15.9   11/4/2015      8:25 AM 11.8         Infusion:  Chemotherapy Flowsheet 2/23/2016 2/2/2016 1/6/2016   Cycle C6 D1 C5D1 C4D1   Date 2/23/2016 2/2/2016 1/6/2016   Drug / Regimen Taxol/Carboplatin Taxol/Carbo Taxol/Carbp   Dosage 260 mg/333 mg see mar see mar   Pre Hydration  ml 250 250   Post Hydration  250 250   Pre Meds see mar see mar see mar   Notes ninoska well       Chemotherapy Flowsheet 12/16/2015 11/25/2015 11/4/2015   Cycle C3D1 C2D1 C1D1   Date 12/16/2015 11/25/2015 11/4/2015   Drug / Regimen Taxol/ Carbo Taxol/ Carbo Taxol/Carbo   Dosage See MAR  [phone]   Pre Meds   see mar         Imaging:    US TRANSVAGINAL: 12/30/16    Impression   Impression:        1. The patient is status post hysterectomy and bilateral oophorectomy.       2. There is a large (approximately 9.5 x 6.6 x 6.6 cm) cystic mass in the right adnexal region. No blood flow is identified within this finding.       Differential diagnostic considerations include a recurrent ovarian neoplasm, a postoperative seroma, or lymphocele. Mild anterolateral mural nodularity is questioned along the anterior aspect of this lesion on the prior CT scan. Further evaluation with pre-and postcontrast MR imaging of the pelvis should be considered.       A copy of this report will be faxed to the ordering clinician's office and the office staff will be notified of the incoming fax by telephone. POSITRON EMISSION TOMOGRAPHY/COMPUTED TOMOGRAPHY:    3/29/2017     INDICATION: Ovarian carcinoma.    Subsequent treatment strategy.          RADIOPHARMACEUTICAL: 14.4 mCi F-18 FDG i.v.   Right antecubital injection site.     SERUM GLUCOSE: 91 mg/dL.          COMPARISON PET/CT: None.       CORRELATIVE ANATOMIC IMAGING: CT chest, abdomen, pelvis 3/14/2016.        TECHNIQUE: Initially, F-18 FDG was administered intravenously. After  approximately 11-39 minutes, helically acquired CT was obtained from the skull  base to mid thighs with subsequent PET images, both obtained during normal tidal  respiration. The PET and CT images were reconstructed in the axial, coronal and  sagittal planes and viewed in a co-registered fashion. CT images are not  performed using standard diagnostic CT protocol, limited by lack of intravenous  iodinated contrast, reduced mAs and kVp (x-rays), and acquisition during normal  tidal respiratory motion (to improve fusion with PET images, however, could make  size assessment of abnormalities less accurate).           -----PET FINDINGS-----        Reference value mediastinal blood pool SUV (max) =  3.0.  -Reference value liver parenchyma SUV (max) =  4.5.     Skull base/Neck: No evidence of malignant glucose activity.          Chest: There is mild FDG uptake within a 1.0 x 1.9 cm left axillary lymph node;  SUV (max) =  2.8. This lymph node has a prominent fatty hilum. The significance  of this finding is unclear. This may represent reactive adenopathy although  metastatic disease cannot be excluded with certainty.     There are no other foci of malignant FDG uptake in the chest.         Abdomen/Pelvis: There is mild FDG uptake within a 1.1 cm diameter left  retroperitoneal lymph node; SUV (max) =  2.3 potentially representing metastatic  disease.     There are no other foci of malignant FDG uptake in the abdomen or pelvis. There  is no evidence of elevated FDG uptake within the 6.1 x 2.2 cm cystic lesion  previously noted in the right hemipelvis.        Osseous structures: Bone scintigraphy is more sensitive for detection of  osteoblastic metastatic disease and PET more sensitive for osteolytic lesions,  suggesting the imaging modalities should be regarded as complementary, and  should not replace each other.  Otherwise, no evidence of malignant glucose  activity.          -----ADDITIONAL CT FINDINGS-----     Exam is limited due to lack of intravenous contrast.         CT Skull base/Neck: No significant focal abnormality on this noncontrast study.     No enlarged adenopathy.     CT Chest: Abnormalities are detailed above. Otherwise no significant interval  change since the recent CT dated 3/14/2016         CT Abdomen/Pelvis: No new finding since the recent CT dated 3/14/2016.     CT Osseous structures: No significant focal abnormality.        Impression:      1. Mild FDG uptake within a small left retroperitoneal lymph node potentially  representing metastatic neoplasm. 2. Mild nonspecific FDG uptake within a left axillary lymph node. 3. No other foci of malignant FDG uptake      Her pathology revealed: 8/12/15  A: LYMPH NODE, RIGHT PELVIC, BIOPSY:  SMALL LYMPHOCYTIC LYMPHOMA (CHRONIC LYMPHOCYTIC LEUKEMIA). NEGATIVE FOR METASTATIC ADENOCARCINOMA. B: RIGHT PELVIC MASS: ADENOCARCINOMA. SPECIMEN: RIGHT OVARY. PROCEDURE: RIGHT OOPHORECTOMY. LYMPH NODE SAMPLING: PERFORMED. SPECIMEN INTEGRITY: RIGHT OVARY CAPSULE RUPTURED. PRIMARY TUMOR SITE: RIGHT OVARY. OVARIAN SURFACE INVOLVEMENT: ABSENT.  TUMOR SIZE: 11.0 CM GREATEST DIMENSION. HISTOLOGIC TYPE: ENDOMETRIOID CARCINOMA. HISTOLOGIC GRADE: MODERATELY DIFFERENTIATED (G2). IMPLANTS: N/A  EXTENT OF INVOLVEMENT OF OTHER TISSUE/ORGANS: NONE IDENTIFIED. LYMPH/VASCULAR INVASION: PRESENT. PATHOLOGIC STAGE: pT1c, N0, M0 (FIGO IC). NUMBER OF LYMPH NODES EXAMINED: 19.  NUMBER OF LYMPH NODES INVOLVED: 0.  C: RIGHT PELVIC LYMPH NODE:  SMALL LYMPHOCYTIC LYMPHOMA. NEGATIVE FOR METASTATIC ADENOCARCINOMA. D: LEFT SALPINGO-OOPHORECTOMY:  ENDOMETRIOSIS AND ADHESIONS. NEGATIVE FOR MALIGNANCY. E: PELVIC MASS, BIOPSY:  BENIGN ADHESIONS AND PERITONEAL TISSUE. NEGATIVE FOR MALIGNANCY. F: TOTAL HYSTERECTOMY:  CERVIX: NO ABNORMALITY SEEN.  ENDOMETRIUM: ATROPHIC.   MYOMETRIUM: SUBMUCOSAL AND INTRAMURAL LEIOMYOMAS. SEROSA: ENDOMETRIOSIS WITH ADHESIONS. NEGATIVE FOR MALIGNANCY. G: OMENTUM, RESECTION:  FOAMY MACROPHAGES AND HISTIOCYTES. NEGATIVE FOR MALIGNANCY. H: LYMPH NODES, RIGHT PARA-AORTIC, DISSECTION:  SMALL LYMPHOCYTIC LYMPHOMA. NEGATIVE FOR METASTATIC ADENOCARCINOMA (FOUR NODES). I: LYMPH NODES, LEFT PARA-AORTIC, DISSECTION:  SMALL LYMPHOCYTIC LYMPHOMA. NEGATIVE FOR METASTATIC ADENOCARCINOMA (TWO NODES). J: LYMPH NODES, LEFT PELVIC, DISSECTION:  SMALL LYMHOCYTIC LYMPHOMA. NEGATIVE FOR METASTATIC ADENOCARCINOMA (FIVE NODES). K: LYMPH NODES, RIGHT PELVIC, DISSECTION:    SMALL LYMPHOCYTIC LYMPHOMA. NEGATIVE FOR METASTATIC ADENOCARCINOMA (SIX NODES). Medications:     Current Outpatient Prescriptions on File Prior to Visit   Medication Sig Dispense Refill    pyridoxine, vitamin B6, (VITAMIN B-6) 100 mg tablet Take 100 mg by mouth daily.  cyanocobalamin 1,000 mcg tablet Take 500 mcg by mouth daily.  lidocaine-prilocaine (EMLA) topical cream Apply 1 g to affected area as needed for Pain. Place a dime amount over port site 1 hour prior to having port accessed. Cover with saran wrap. 30 g 3    acetaminophen (TYLENOL) 325 mg tablet Take 650 mg by mouth every four (4) hours as needed for Pain.  naproxen (NAPROSYN) 500 mg tablet Take 500 mg by mouth two (2) times daily as needed.  levothyroxine (SYNTHROID) 175 mcg tablet Take 175 mcg by mouth Daily (before breakfast). Take dos  Indications: HYPOTHYROIDISM       No current facility-administered medications on file prior to visit. Allergies:      Allergies   Allergen Reactions    Other Plant, Animal, Environmental Swelling     Surgical steel earrings, and surgical clamps caused swelling and / or infection    Penicillins Other (comments)     Psychological reaction       OBJECTIVE:    Vitals:   Visit Vitals    /71 (BP 1 Location: Right arm, BP Patient Position: Sitting)    Pulse 76    Temp 96.2 °F (35.7 °C) (Oral)  Resp 16    Wt 99.7 kg (219 lb 12.8 oz)    SpO2 97%    BMI 36.02 kg/m2       Physical Examination:  Physical Exam  VITAL SIGNS: Visit Vitals    /71 (BP 1 Location: Right arm, BP Patient Position: Sitting)    Pulse 76    Temp 96.2 °F (35.7 °C) (Oral)    Resp 16    Wt 99.7 kg (219 lb 12.8 oz)    SpO2 97%    BMI 36.02 kg/m2          GENERAL KRISTINE: in no apparent distress and well developed and well nourished   Breast deferred     MUSCULOSKEL: no joint tenderness, deformity or swelling   INTEGUMENT:  warm and dry, no rashes or lesions   ABDOMEN . soft, NT, ND, No masses appreciated   EXTREMITIES: extremities normal, atraumatic, no cyanosis or edema   PELVIC: NEFG, Spec exam revealed atrophic mucosa, BME revealed no masses, lymphocyst smaller on exam   RECTAL: deferred   MANUEL SURVEY: Cervical, supraclavicular, axillary and inguinal nodes normal.   NEURO: Grossly normal         IMPRESSION/PLAN:  Stage IIB G2 endometrioid ovarian cancer who is clinically NGUYEN  s/p adjuvant Carboplatin and Taxol status post 6 cycles  Postoperative course complicated by necrotizing wound infection s/p debridement x2 now healed well. Previously Reviewed CT and ultrasound c/w lymphocyst and is stable in size  Reviewed labs with  back to normal  Previously reviewed PETCT with uptake in lymph nodes likely due to lymphoma but no other sites  Health maintenance- explained that breast exam does not replace need for mammogram; encourage her to call PCP to set up  Reviewed surveillance strategy at this point with exams,  every 3 months  Port flush every 6 weeks   Chronic lymphocytic lymphoma. F/u with dr. Jayne Bradley    Total time spent was 40 minutes, >50% of this time was spent counseling regarding clinical findings, chemotherapy associated symptoms and management and coordination of care.      Nano Caro MD  Gynecologic Oncology  4/18/2017/1:52 PM

## 2017-04-18 NOTE — PROGRESS NOTES
Pt her ambulatory for follow up with Dr Mitchell Reyez. Last chemo was 2-23-17. Pt had bronchitis and Gastroenteritis in December 2016. Pt denies any complaints n/v/d/constipation. Feet remain \"a little numb still\". Still taking B-6 and B-12 vitamins. No ringing in her ears. \"I can tell there is a difference in my eyes, I think it is my cataracts\". Has not made an appointment with her eye doctor yet. Education provided and pt to follow up as scheduled.

## 2017-04-25 ENCOUNTER — HOSPITAL ENCOUNTER (OUTPATIENT)
Dept: INFUSION THERAPY | Age: 72
Discharge: HOME | End: 2017-04-25
Payer: MEDICARE

## 2017-04-25 VITALS
RESPIRATION RATE: 18 BRPM | TEMPERATURE: 97.8 F | SYSTOLIC BLOOD PRESSURE: 143 MMHG | BODY MASS INDEX: 36.5 KG/M2 | DIASTOLIC BLOOD PRESSURE: 53 MMHG | OXYGEN SATURATION: 95 % | WEIGHT: 222.7 LBS | HEART RATE: 72 BPM

## 2017-04-25 PROCEDURE — 77030012965 HC NDL HUBR BBMI -A

## 2017-04-25 PROCEDURE — 74011250636 HC RX REV CODE- 250/636: Performed by: OBSTETRICS & GYNECOLOGY

## 2017-04-25 PROCEDURE — 96523 IRRIG DRUG DELIVERY DEVICE: CPT

## 2017-04-25 RX ORDER — HEPARIN 100 UNIT/ML
500 SYRINGE INTRAVENOUS ONCE
Status: COMPLETED | OUTPATIENT
Start: 2017-04-25 | End: 2017-04-25

## 2017-04-25 RX ORDER — SODIUM CHLORIDE 0.9 % (FLUSH) 0.9 %
10-40 SYRINGE (ML) INJECTION AS NEEDED
Status: DISCONTINUED | OUTPATIENT
Start: 2017-04-25 | End: 2017-04-29 | Stop reason: HOSPADM

## 2017-04-25 RX ADMIN — Medication 500 UNITS: at 10:56

## 2017-04-25 RX ADMIN — Medication 20 ML: at 10:55

## 2017-04-25 NOTE — PROGRESS NOTES
[facility] Progress Note    Date: 2017    Name: Nathan Haddad    MRN: [id number]         : 1945    Q 6 Week Port flush     Ms. Luz Tsai to [facility], ambulatory, at 56 accompanied by her . Pt was assessed and education was provided. Patient denies complaints today. Ms. Darvin Shanks vitals were reviewed. Visit Vitals    /53 (BP 1 Location: Right arm, BP Patient Position: Sitting)    Pulse 72    Temp 97.8 °F (36.6 °C)    Resp 18    Wt 101 kg (222 lb 11.2 oz)    SpO2 95%    Breastfeeding No    BMI 36.5 kg/m2       Left chest mediport was accessed with 20 gauge 3/4 inch العراقي(s) after chloroprep. Port flushed easily and had brisk blood return. Mediport flushed with NS 20 ml and Heparin 500 units then de-accessed. No irritation or bleeding noted. Band-Aid applied. Ms. Luz Tsai tolerated the procedure, and had no complaints. Patient armband removed and shredded. Ms. Luz Tsai was discharged from [place] [address] in stable condition at 1055. She is to return on 17 at 1100 for her next port flush/labs appointment.     Helene Singh RN  2017

## 2017-06-06 ENCOUNTER — HOSPITAL ENCOUNTER (OUTPATIENT)
Dept: INFUSION THERAPY | Age: 72
Discharge: HOME | End: 2017-06-06
Payer: MEDICARE

## 2017-06-06 VITALS
BODY MASS INDEX: 36.56 KG/M2 | HEIGHT: 66 IN | SYSTOLIC BLOOD PRESSURE: 138 MMHG | RESPIRATION RATE: 18 BRPM | WEIGHT: 227.5 LBS | HEART RATE: 71 BPM | OXYGEN SATURATION: 96 % | TEMPERATURE: 98 F | DIASTOLIC BLOOD PRESSURE: 81 MMHG

## 2017-06-06 LAB
ALBUMIN SERPL BCP-MCNC: 3.9 G/DL (ref 3.4–5)
ALBUMIN/GLOB SERPL: 1.3 {RATIO} (ref 0.8–1.7)
ALP SERPL-CCNC: 100 U/L (ref 45–117)
ALT SERPL-CCNC: 24 U/L (ref 13–56)
ANION GAP BLD CALC-SCNC: 11 MMOL/L (ref 3–18)
AST SERPL W P-5'-P-CCNC: 23 U/L (ref 15–37)
BASOPHILS # BLD AUTO: 0 K/UL (ref 0–0.06)
BASOPHILS # BLD: 0 % (ref 0–2)
BILIRUB SERPL-MCNC: 0.4 MG/DL (ref 0.2–1)
BUN SERPL-MCNC: 10 MG/DL (ref 7–18)
BUN/CREAT SERPL: 13 (ref 12–20)
CALCIUM SERPL-MCNC: 9 MG/DL (ref 8.5–10.1)
CHLORIDE SERPL-SCNC: 106 MMOL/L (ref 100–108)
CO2 SERPL-SCNC: 26 MMOL/L (ref 21–32)
CREAT SERPL-MCNC: 0.78 MG/DL (ref 0.6–1.3)
DIFFERENTIAL METHOD BLD: ABNORMAL
EOSINOPHIL # BLD: 0.2 K/UL (ref 0–0.4)
EOSINOPHIL NFR BLD: 3 % (ref 0–5)
ERYTHROCYTE [DISTWIDTH] IN BLOOD BY AUTOMATED COUNT: 14.2 % (ref 11.6–14.5)
GLOBULIN SER CALC-MCNC: 2.9 G/DL (ref 2–4)
GLUCOSE SERPL-MCNC: 88 MG/DL (ref 74–99)
HCT VFR BLD AUTO: 35.1 % (ref 35–45)
HGB BLD-MCNC: 11.7 G/DL (ref 12–16)
LYMPHOCYTES # BLD AUTO: 17 % (ref 21–52)
LYMPHOCYTES # BLD: 1.1 K/UL (ref 0.9–3.6)
MAGNESIUM SERPL-MCNC: 1.5 MG/DL (ref 1.6–2.6)
MCH RBC QN AUTO: 28.2 PG (ref 24–34)
MCHC RBC AUTO-ENTMCNC: 33.3 G/DL (ref 31–37)
MCV RBC AUTO: 84.6 FL (ref 74–97)
MONOCYTES # BLD: 0.6 K/UL (ref 0.05–1.2)
MONOCYTES NFR BLD AUTO: 8 % (ref 3–10)
NEUTS SEG # BLD: 4.8 K/UL (ref 1.8–8)
NEUTS SEG NFR BLD AUTO: 72 % (ref 40–73)
PLATELET # BLD AUTO: 361 K/UL (ref 135–420)
PMV BLD AUTO: 9.5 FL (ref 9.2–11.8)
POTASSIUM SERPL-SCNC: 4 MMOL/L (ref 3.5–5.5)
PROT SERPL-MCNC: 6.8 G/DL (ref 6.4–8.2)
RBC # BLD AUTO: 4.15 M/UL (ref 4.2–5.3)
SODIUM SERPL-SCNC: 143 MMOL/L (ref 136–145)
WBC # BLD AUTO: 6.7 K/UL (ref 4.6–13.2)

## 2017-06-06 PROCEDURE — 85025 COMPLETE CBC W/AUTO DIFF WBC: CPT | Performed by: OBSTETRICS & GYNECOLOGY

## 2017-06-06 PROCEDURE — 36415 COLL VENOUS BLD VENIPUNCTURE: CPT | Performed by: OBSTETRICS & GYNECOLOGY

## 2017-06-06 PROCEDURE — 77030012965 HC NDL HUBR BBMI -A

## 2017-06-06 PROCEDURE — 80053 COMPREHEN METABOLIC PANEL: CPT | Performed by: OBSTETRICS & GYNECOLOGY

## 2017-06-06 PROCEDURE — 36591 DRAW BLOOD OFF VENOUS DEVICE: CPT

## 2017-06-06 PROCEDURE — 86304 IMMUNOASSAY TUMOR CA 125: CPT | Performed by: OBSTETRICS & GYNECOLOGY

## 2017-06-06 PROCEDURE — 83735 ASSAY OF MAGNESIUM: CPT | Performed by: OBSTETRICS & GYNECOLOGY

## 2017-06-06 PROCEDURE — 74011250636 HC RX REV CODE- 250/636: Performed by: OBSTETRICS & GYNECOLOGY

## 2017-06-06 RX ORDER — SODIUM CHLORIDE 0.9 % (FLUSH) 0.9 %
10-40 SYRINGE (ML) INJECTION AS NEEDED
Status: DISCONTINUED | OUTPATIENT
Start: 2017-06-06 | End: 2017-06-10 | Stop reason: HOSPADM

## 2017-06-06 RX ORDER — HEPARIN 100 UNIT/ML
500 SYRINGE INTRAVENOUS ONCE
Status: COMPLETED | OUTPATIENT
Start: 2017-06-06 | End: 2017-06-06

## 2017-06-06 RX ADMIN — Medication 30 ML: at 11:25

## 2017-06-06 RX ADMIN — Medication 500 UNITS: at 11:25

## 2017-06-06 NOTE — PROGRESS NOTES
[facility] Progress Note    Date: 2017    Name: Chaz Boucher    MRN: [id number]         : 1945    Q 6 Week Port flush & Lab Draw    Ms. Ruth Carroll to [facility], ambulatory, at  accompanied by her . Pt was assessed and education was provided. She denies complaints today. Ms. Leighton Cruz vitals were reviewed. Visit Vitals    /81 (BP 1 Location: Right arm, BP Patient Position: Sitting)    Pulse 71    Temp 98 °F (36.7 °C)    Resp 18    Ht 5' 5.5\" (1.664 m)    Wt 103.2 kg (227 lb 8 oz)    SpO2 96%    Breastfeeding No    BMI 37.28 kg/m2       Left chest mediport was accessed with 20 gauge one inch العراقي(s) after chloroprep. Port flushed easily and had brisk blood return. Blood drawn off and sent to lab for CBC, CMP, Mg and CA-125 after 10 ml waste per written orders. Recent Results (from the past 12 hour(s))   CBC WITH AUTOMATED DIFF    Collection Time: 17 11:24 AM   Result Value Ref Range    WBC 6.7 4.6 - 13.2 K/uL    RBC 4.15 (L) 4.20 - 5.30 M/uL    HGB 11.7 (L) 12.0 - 16.0 g/dL    HCT 35.1 35.0 - 45.0 %    MCV 84.6 74.0 - 97.0 FL    MCH 28.2 24.0 - 34.0 PG    MCHC 33.3 31.0 - 37.0 g/dL    RDW 14.2 11.6 - 14.5 %    PLATELET 106 687 - 680 K/uL    MPV 9.5 9.2 - 11.8 FL    NEUTROPHILS 72 40 - 73 %    LYMPHOCYTES 17 (L) 21 - 52 %    MONOCYTES 8 3 - 10 %    EOSINOPHILS 3 0 - 5 %    BASOPHILS 0 0 - 2 %    ABS. NEUTROPHILS 4.8 1.8 - 8.0 K/UL    ABS. LYMPHOCYTES 1.1 0.9 - 3.6 K/UL    ABS. MONOCYTES 0.6 0.05 - 1.2 K/UL    ABS. EOSINOPHILS 0.2 0.0 - 0.4 K/UL    ABS.  BASOPHILS 0.0 0.0 - 0.06 K/UL    DF AUTOMATED     MAGNESIUM    Collection Time: 17 11:24 AM   Result Value Ref Range    Magnesium 1.5 (L) 1.6 - 2.6 mg/dL   METABOLIC PANEL, COMPREHENSIVE    Collection Time: 17 11:24 AM   Result Value Ref Range    Sodium 143 136 - 145 mmol/L    Potassium 4.0 3.5 - 5.5 mmol/L    Chloride 106 100 - 108 mmol/L    CO2 26 21 - 32 mmol/L    Anion gap 11 3.0 - 18 mmol/L    Glucose 88 74 - 99 mg/dL    BUN 10 7.0 - 18 MG/DL    Creatinine 0.78 0.6 - 1.3 MG/DL    BUN/Creatinine ratio 13 12 - 20      GFR est AA >60 >60 ml/min/1.73m2    GFR est non-AA >60 >60 ml/min/1.73m2    Calcium 9.0 8.5 - 10.1 MG/DL    Bilirubin, total 0.4 0.2 - 1.0 MG/DL    ALT (SGPT) 24 13 - 56 U/L    AST (SGOT) 23 15 - 37 U/L    Alk. phosphatase 100 45 - 117 U/L    Protein, total 6.8 6.4 - 8.2 g/dL    Albumin 3.9 3.4 - 5.0 g/dL    Globulin 2.9 2.0 - 4.0 g/dL    A-G Ratio 1.3 0.8 - 1.7       Mediport flushed with NS 20 ml and Heparin 500 units then de-accessed. No irritation or bleeding noted. Band-Aid applied. Ms. Luz Tsai tolerated the procedure, and had no complaints. Patient armband removed and shredded. Ms. Luz Tsai was discharged from [place] [address] in stable condition at 1125. She is to return on 7/18/17 at 1030 for her next port flush appointment.     Helene Singh RN  June 6, 2017

## 2017-06-07 ENCOUNTER — TELEPHONE (OUTPATIENT)
Dept: ONCOLOGY | Age: 72
End: 2017-06-07

## 2017-06-07 LAB — CANCER AG125 SERPL-ACNC: 16.4 U/ML (ref 0–38.1)

## 2017-07-18 ENCOUNTER — HOSPITAL ENCOUNTER (OUTPATIENT)
Dept: INFUSION THERAPY | Age: 72
Discharge: HOME | End: 2017-07-18
Payer: MEDICARE

## 2017-07-18 ENCOUNTER — OFFICE VISIT (OUTPATIENT)
Dept: ONCOLOGY | Age: 72
End: 2017-07-18

## 2017-07-18 VITALS
WEIGHT: 229 LBS | TEMPERATURE: 97.5 F | DIASTOLIC BLOOD PRESSURE: 60 MMHG | OXYGEN SATURATION: 97 % | SYSTOLIC BLOOD PRESSURE: 147 MMHG | BODY MASS INDEX: 37.53 KG/M2 | HEART RATE: 71 BPM

## 2017-07-18 VITALS
RESPIRATION RATE: 18 BRPM | OXYGEN SATURATION: 98 % | DIASTOLIC BLOOD PRESSURE: 56 MMHG | TEMPERATURE: 96.8 F | SYSTOLIC BLOOD PRESSURE: 130 MMHG | HEART RATE: 70 BPM

## 2017-07-18 DIAGNOSIS — C56.9 OVARIAN CANCER, UNSPECIFIED LATERALITY (HCC): ICD-10-CM

## 2017-07-18 PROCEDURE — 74011250636 HC RX REV CODE- 250/636: Performed by: OBSTETRICS & GYNECOLOGY

## 2017-07-18 PROCEDURE — 96523 IRRIG DRUG DELIVERY DEVICE: CPT

## 2017-07-18 PROCEDURE — 77030012965 HC NDL HUBR BBMI -A

## 2017-07-18 RX ORDER — SODIUM CHLORIDE 0.9 % (FLUSH) 0.9 %
10 SYRINGE (ML) INJECTION AS NEEDED
Status: DISCONTINUED | OUTPATIENT
Start: 2017-07-18 | End: 2017-07-22 | Stop reason: HOSPADM

## 2017-07-18 RX ORDER — HEPARIN 100 UNIT/ML
500 SYRINGE INTRAVENOUS ONCE
Status: COMPLETED | OUTPATIENT
Start: 2017-07-18 | End: 2017-07-18

## 2017-07-18 RX ORDER — LIDOCAINE AND PRILOCAINE 25; 25 MG/G; MG/G
1 CREAM TOPICAL AS NEEDED
Qty: 30 G | Refills: 3 | Status: SHIPPED | OUTPATIENT
Start: 2017-07-18 | End: 2018-02-26

## 2017-07-18 RX ADMIN — HEPARIN 500 UNITS: 100 SYRINGE at 11:00

## 2017-07-18 RX ADMIN — Medication 10 ML: at 10:59

## 2017-07-18 NOTE — PROGRESS NOTES
[facility]  [address] [place] [address], [address], [address] [place] [id number] [phone]        Patient ID:  Name: Aneta Olson  MRM: [id number]  : 1945/71 y.o. Date: 2017    SUBJECTIVE:  This is a 78 y.o.   female with Stage IIB, Grade 2 Endometrioid Ovarian Cancer s/p adjuvant chemotherapy with Carboplatin and Taxol s/p 6 cycles. Patient is here today for a 3 month f/u. Patient denies any Gyn symptoms. Patient specifically denies any abnormal vaginal bleeding, vaginal discharge, or vaginal irritation. Patient also denies abdominal pain, pelvic pain, or abdominal bloating. Patient is voiding without difficulty and bowel movements are regular. Patient is status post diagnostic laparoscopy converted to exploratory laparotomy, total abdominal hysterectomy, bilateral salpingo-oophorectomy, radical resection of pelvic mass with gastrocolic omentectomy, bilateral pelvic and periaortic lymph node dissection, optimal debulking of pelvic tumor and washings 2015. Post op course was complicated by necrotizing wound infection requiring debridement x 2 in the operating room. Patient has had a breast biopsy c/w hyaline cartilage favor chondroma. Denies N/V, abdominopelvic pain, change in bladder or bowel.        Labs:  Component      Latest Ref Rng & Units 2017          11:24 AM   Cancer Ag (CA) 125      0.0 - 38.1 U/mL 16.4     Component      Latest Ref Rng & Units 3/14/2017          10:52 AM   Cancer Ag (CA) 125      0.0 - 38.1 U/mL 18.3     Component      Latest Ref Rng & Units 2016          10:00 AM   Cancer Ag (CA) 125      0.0 - 38.1 U/mL 29.3     Component      Latest Ref Rng 10/4/2016          11:20 AM   Cancer Ag (CA) 125      0.0 - 38.1 U/mL 19.1     Component       Cancer Ag (CA) 125   Latest Ref Rng       0.0 - 38.1 U/mL   2016      11:00 AM 18.6   2016      11:15 AM 19.5     Component       Cancer Ag (CA) 125   Latest Ref Rng       0.0 - 34.0 U/mL   2/23/2016      8:37 AM 18.5     Component       Cancer Ag (CA) 125   Latest Ref Rng       0.0 - 34.0 U/mL   2/2/2016      8:15 AM 19.0   1/6/2016      8:20 AM 17.9   12/16/2015      8:35 AM 17.1   11/25/2015      8:20 AM 15.9   11/4/2015      8:25 AM 11.8         Infusion:  Chemotherapy Flowsheet 2/23/2016 2/2/2016 1/6/2016   Cycle C6 D1 C5D1 C4D1   Date 2/23/2016 2/2/2016 1/6/2016   Drug / Regimen Taxol/Carboplatin Taxol/Carbo Taxol/Carbp   Dosage 260 mg/333 mg see mar see mar   Pre Hydration  ml 250 250   Post Hydration  250 250   Pre Meds see mar see mar see mar   Notes ninoska well       Chemotherapy Flowsheet 12/16/2015 11/25/2015 11/4/2015   Cycle C3D1 C2D1 C1D1   Date 12/16/2015 11/25/2015 11/4/2015   Drug / Regimen Taxol/ Carbo Taxol/ Carbo Taxol/Carbo   Dosage See MAR  [id number]   Pre Meds   see mar         Imaging:    US TRANSVAGINAL: 12/30/16    Impression   Impression:        1. The patient is status post hysterectomy and bilateral oophorectomy.       2. There is a large (approximately 9.5 x 6.6 x 6.6 cm) cystic mass in the right adnexal region. No blood flow is identified within this finding.       Differential diagnostic considerations include a recurrent ovarian neoplasm, a postoperative seroma, or lymphocele. Mild anterolateral mural nodularity is questioned along the anterior aspect of this lesion on the prior CT scan. Further evaluation with pre-and postcontrast MR imaging of the pelvis should be considered.       A copy of this report will be faxed to the ordering clinician's office and the office staff will be notified of the incoming fax by telephone. POSITRON EMISSION TOMOGRAPHY/COMPUTED TOMOGRAPHY:    3/29/2017     INDICATION: Ovarian carcinoma.    Subsequent treatment strategy.          RADIOPHARMACEUTICAL: 14.4 mCi F-18 FDG i.v.   Right antecubital injection site.     SERUM GLUCOSE: 91 mg/dL.          COMPARISON PET/CT: None.       CORRELATIVE ANATOMIC IMAGING: CT chest, abdomen, pelvis 3/14/2016.        TECHNIQUE: Initially, F-18 FDG was administered intravenously. After  approximately 94-76 minutes, helically acquired CT was obtained from the skull  base to mid thighs with subsequent PET images, both obtained during normal tidal  respiration. The PET and CT images were reconstructed in the axial, coronal and  sagittal planes and viewed in a co-registered fashion. CT images are not  performed using standard diagnostic CT protocol, limited by lack of intravenous  iodinated contrast, reduced mAs and kVp (x-rays), and acquisition during normal  tidal respiratory motion (to improve fusion with PET images, however, could make  size assessment of abnormalities less accurate).           -----PET FINDINGS-----        Reference value mediastinal blood pool SUV (max) =  3.0.  -Reference value liver parenchyma SUV (max) =  4.5.     Skull base/Neck: No evidence of malignant glucose activity.          Chest: There is mild FDG uptake within a 1.0 x 1.9 cm left axillary lymph node;  SUV (max) =  2.8. This lymph node has a prominent fatty hilum. The significance  of this finding is unclear. This may represent reactive adenopathy although  metastatic disease cannot be excluded with certainty.     There are no other foci of malignant FDG uptake in the chest.         Abdomen/Pelvis: There is mild FDG uptake within a 1.1 cm diameter left  retroperitoneal lymph node; SUV (max) =  2.3 potentially representing metastatic  disease.     There are no other foci of malignant FDG uptake in the abdomen or pelvis.  There  is no evidence of elevated FDG uptake within the 6.1 x 2.2 cm cystic lesion  previously noted in the right hemipelvis.        Osseous structures: Bone scintigraphy is more sensitive for detection of  osteoblastic metastatic disease and PET more sensitive for osteolytic lesions,  suggesting the imaging modalities should be regarded as complementary, and  should not replace each other. Otherwise, no evidence of malignant glucose  activity.          -----ADDITIONAL CT FINDINGS-----     Exam is limited due to lack of intravenous contrast.         CT Skull base/Neck: No significant focal abnormality on this noncontrast study.     No enlarged adenopathy.     CT Chest: Abnormalities are detailed above. Otherwise no significant interval  change since the recent CT dated 3/14/2016         CT Abdomen/Pelvis: No new finding since the recent CT dated 3/14/2016.     CT Osseous structures: No significant focal abnormality.        Impression:      1. Mild FDG uptake within a small left retroperitoneal lymph node potentially  representing metastatic neoplasm. 2. Mild nonspecific FDG uptake within a left axillary lymph node. 3. No other foci of malignant FDG uptake      Her pathology revealed: 8/12/15  A: LYMPH NODE, RIGHT PELVIC, BIOPSY:  SMALL LYMPHOCYTIC LYMPHOMA (CHRONIC LYMPHOCYTIC LEUKEMIA). NEGATIVE FOR METASTATIC ADENOCARCINOMA. B: RIGHT PELVIC MASS: ADENOCARCINOMA. SPECIMEN: RIGHT OVARY. PROCEDURE: RIGHT OOPHORECTOMY. LYMPH NODE SAMPLING: PERFORMED. SPECIMEN INTEGRITY: RIGHT OVARY CAPSULE RUPTURED. PRIMARY TUMOR SITE: RIGHT OVARY. OVARIAN SURFACE INVOLVEMENT: ABSENT.  TUMOR SIZE: 11.0 CM GREATEST DIMENSION. HISTOLOGIC TYPE: ENDOMETRIOID CARCINOMA. HISTOLOGIC GRADE: MODERATELY DIFFERENTIATED (G2). IMPLANTS: N/A  EXTENT OF INVOLVEMENT OF OTHER TISSUE/ORGANS: NONE IDENTIFIED. LYMPH/VASCULAR INVASION: PRESENT. PATHOLOGIC STAGE: pT1c, N0, M0 (FIGO IC). NUMBER OF LYMPH NODES EXAMINED: 19.  NUMBER OF LYMPH NODES INVOLVED: 0.  C: RIGHT PELVIC LYMPH NODE:  SMALL LYMPHOCYTIC LYMPHOMA. NEGATIVE FOR METASTATIC ADENOCARCINOMA. D: LEFT SALPINGO-OOPHORECTOMY:  ENDOMETRIOSIS AND ADHESIONS. NEGATIVE FOR MALIGNANCY. E: PELVIC MASS, BIOPSY:  BENIGN ADHESIONS AND PERITONEAL TISSUE. NEGATIVE FOR MALIGNANCY.   F: TOTAL HYSTERECTOMY:  CERVIX: NO ABNORMALITY SEEN.  ENDOMETRIUM: ATROPHIC. MYOMETRIUM: SUBMUCOSAL AND INTRAMURAL LEIOMYOMAS. SEROSA: ENDOMETRIOSIS WITH ADHESIONS. NEGATIVE FOR MALIGNANCY. G: OMENTUM, RESECTION:  FOAMY MACROPHAGES AND HISTIOCYTES. NEGATIVE FOR MALIGNANCY. H: LYMPH NODES, RIGHT PARA-AORTIC, DISSECTION:  SMALL LYMPHOCYTIC LYMPHOMA. NEGATIVE FOR METASTATIC ADENOCARCINOMA (FOUR NODES). I: LYMPH NODES, LEFT PARA-AORTIC, DISSECTION:  SMALL LYMPHOCYTIC LYMPHOMA. NEGATIVE FOR METASTATIC ADENOCARCINOMA (TWO NODES). J: LYMPH NODES, LEFT PELVIC, DISSECTION:  SMALL LYMHOCYTIC LYMPHOMA. NEGATIVE FOR METASTATIC ADENOCARCINOMA (FIVE NODES). K: LYMPH NODES, RIGHT PELVIC, DISSECTION:    SMALL LYMPHOCYTIC LYMPHOMA. NEGATIVE FOR METASTATIC ADENOCARCINOMA (SIX NODES). Medications:     Current Outpatient Prescriptions on File Prior to Visit   Medication Sig Dispense Refill    pyridoxine, vitamin B6, (VITAMIN B-6) 100 mg tablet Take 100 mg by mouth daily.  cyanocobalamin 1,000 mcg tablet Take 500 mcg by mouth daily.  lidocaine-prilocaine (EMLA) topical cream Apply 1 g to affected area as needed for Pain. Place a dime amount over port site 1 hour prior to having port accessed. Cover with saran wrap. 30 g 3    acetaminophen (TYLENOL) 325 mg tablet Take 650 mg by mouth every four (4) hours as needed for Pain.  naproxen (NAPROSYN) 500 mg tablet Take 500 mg by mouth two (2) times daily as needed.  levothyroxine (SYNTHROID) 175 mcg tablet Take 175 mcg by mouth Daily (before breakfast). Take dos  Indications: HYPOTHYROIDISM       No current facility-administered medications on file prior to visit. Allergies:      Allergies   Allergen Reactions    Other Plant, Animal, Environmental Swelling     Surgical steel earrings, and surgical clamps caused swelling and / or infection    Penicillins Other (comments)     Psychological reaction       OBJECTIVE:    Vitals:   Visit Vitals    /60    Pulse 71    Temp 97.5 °F (36.4 °C) (Oral)    Wt 103.9 kg (229 lb)    SpO2 97%    BMI 37.53 kg/m2       Physical Examination:  Physical Exam  VITAL SIGNS: Visit Vitals    /60    Pulse 71    Temp 97.5 °F (36.4 °C) (Oral)    Wt 103.9 kg (229 lb)    SpO2 97%    BMI 37.53 kg/m2          GENERAL KRISTINE: in no apparent distress and well developed and well nourished   Breast deferred     MUSCULOSKEL: no joint tenderness, deformity or swelling   INTEGUMENT:  warm and dry, no rashes or lesions   ABDOMEN . soft, NT, ND, No masses appreciated   EXTREMITIES: extremities normal, atraumatic, no cyanosis or edema   PELVIC: NEFG, Spec exam revealed atrophic mucosa, BME revealed no masses, lymphocyst not palpable today   RECTAL: deferred   MANUEL SURVEY: Cervical, supraclavicular, axillary and inguinal nodes normal.   NEURO: Grossly normal         IMPRESSION/PLAN:  Stage IIB G2 endometrioid ovarian cancer who is clinically NGUYEN  s/p adjuvant Carboplatin and Taxol status post 6 cycles  Postoperative course complicated by necrotizing wound infection s/p debridement x2 now healed well. Previously Reviewed CT and ultrasound c/w lymphocyst and is stable in size  Reviewed labs with  back to normal  Previously reviewed PETCT with uptake in lymph nodes likely due to lymphoma but no other sites  Reviewed surveillance strategy at this point with exams,  every 3 months  Port flush every 6 weeks   Chronic lymphocytic lymphoma. F/u with dr. Caitie Sears    Total time spent was 40 minutes, >50% of this time was spent counseling regarding clinical findings, chemotherapy associated symptoms and management and coordination of care.      Chante Fernandez MD  Gynecologic Oncology  7/18/2017/1:52 PM

## 2017-07-18 NOTE — PROGRESS NOTES
[facility] Progress Note    Date: 2017    Name: Angelo Morgan    MRN: [id number]         : 1945      Ms. Denea Nur arrived to [facility] at [phone]. Pt is here today for port flush. Ms. Deena Nur was assessed and education was provided. Ms. Vannesa Dumont vitals were reviewed. Visit Vitals    /56 (BP 1 Location: Right arm, BP Patient Position: Sitting)    Pulse 70    Temp 96.8 °F (36 °C)    Resp 18    SpO2 98%    Breastfeeding No       Patient's left upper chest port accessed. Flushes without difficulty and brisk blood return verified. Ms. Deena Nur tolerated procedure without complaints. Port flushed with saline and heparin per order and de-accessed. Band-aid applied to site. Ms. Deena Nur was discharged from [place] [address] in stable condition at 1100. She is to return on 2017 at 830 for her next appointment.     Avery Ch RN  2017

## 2017-08-29 ENCOUNTER — HOSPITAL ENCOUNTER (OUTPATIENT)
Dept: INFUSION THERAPY | Age: 72
Discharge: HOME | End: 2017-08-29
Payer: MEDICARE

## 2017-08-29 VITALS
SYSTOLIC BLOOD PRESSURE: 136 MMHG | OXYGEN SATURATION: 96 % | HEART RATE: 83 BPM | RESPIRATION RATE: 18 BRPM | TEMPERATURE: 97.9 F | DIASTOLIC BLOOD PRESSURE: 71 MMHG

## 2017-08-29 LAB
ALBUMIN SERPL-MCNC: 3.8 G/DL (ref 3.4–5)
ALBUMIN/GLOB SERPL: 1.2 {RATIO} (ref 0.8–1.7)
ALP SERPL-CCNC: 95 U/L (ref 45–117)
ALT SERPL-CCNC: 26 U/L (ref 13–56)
ANION GAP SERPL CALC-SCNC: 9 MMOL/L (ref 3–18)
AST SERPL-CCNC: 19 U/L (ref 15–37)
BASO+EOS+MONOS # BLD AUTO: 0.7 K/UL (ref 0–2.3)
BASO+EOS+MONOS # BLD AUTO: 10 % (ref 0.1–17)
BILIRUB SERPL-MCNC: 0.3 MG/DL (ref 0.2–1)
BUN SERPL-MCNC: 18 MG/DL (ref 7–18)
BUN/CREAT SERPL: 21 (ref 12–20)
CALCIUM SERPL-MCNC: 8.8 MG/DL (ref 8.5–10.1)
CHLORIDE SERPL-SCNC: 102 MMOL/L (ref 100–108)
CO2 SERPL-SCNC: 25 MMOL/L (ref 21–32)
CREAT SERPL-MCNC: 0.84 MG/DL (ref 0.6–1.3)
DIFFERENTIAL METHOD BLD: ABNORMAL
ERYTHROCYTE [DISTWIDTH] IN BLOOD BY AUTOMATED COUNT: 12.5 % (ref 11.5–14.5)
GLOBULIN SER CALC-MCNC: 3.1 G/DL (ref 2–4)
GLUCOSE SERPL-MCNC: 80 MG/DL (ref 74–99)
HCT VFR BLD AUTO: 36.6 % (ref 36–48)
HGB BLD-MCNC: 11.5 G/DL (ref 12–16)
LYMPHOCYTES # BLD: 1 K/UL (ref 1.1–5.9)
LYMPHOCYTES NFR BLD: 14 % (ref 14–44)
MAGNESIUM SERPL-MCNC: 1.5 MG/DL (ref 1.6–2.6)
MCH RBC QN AUTO: 28.7 PG (ref 25–35)
MCHC RBC AUTO-ENTMCNC: 31.4 G/DL (ref 31–37)
MCV RBC AUTO: 91.3 FL (ref 78–102)
NEUTS SEG # BLD: 5.5 K/UL (ref 1.8–9.5)
NEUTS SEG NFR BLD: 76 % (ref 40–70)
PLATELET # BLD AUTO: 318 K/UL (ref 140–440)
POTASSIUM SERPL-SCNC: 4.1 MMOL/L (ref 3.5–5.5)
PROT SERPL-MCNC: 6.9 G/DL (ref 6.4–8.2)
RBC # BLD AUTO: 4.01 M/UL (ref 4.1–5.1)
SODIUM SERPL-SCNC: 136 MMOL/L (ref 136–145)
WBC # BLD AUTO: 7.2 K/UL (ref 4.5–13)

## 2017-08-29 PROCEDURE — 74011000250 HC RX REV CODE- 250: Performed by: OBSTETRICS & GYNECOLOGY

## 2017-08-29 PROCEDURE — 74011250636 HC RX REV CODE- 250/636: Performed by: OBSTETRICS & GYNECOLOGY

## 2017-08-29 PROCEDURE — 85025 COMPLETE CBC W/AUTO DIFF WBC: CPT | Performed by: OBSTETRICS & GYNECOLOGY

## 2017-08-29 PROCEDURE — 36593 DECLOT VASCULAR DEVICE: CPT

## 2017-08-29 PROCEDURE — 83735 ASSAY OF MAGNESIUM: CPT | Performed by: OBSTETRICS & GYNECOLOGY

## 2017-08-29 PROCEDURE — 36591 DRAW BLOOD OFF VENOUS DEVICE: CPT

## 2017-08-29 PROCEDURE — 80053 COMPREHEN METABOLIC PANEL: CPT | Performed by: OBSTETRICS & GYNECOLOGY

## 2017-08-29 PROCEDURE — 77030012965 HC NDL HUBR BBMI -A

## 2017-08-29 PROCEDURE — 86304 IMMUNOASSAY TUMOR CA 125: CPT | Performed by: OBSTETRICS & GYNECOLOGY

## 2017-08-29 RX ORDER — HEPARIN 100 UNIT/ML
500 SYRINGE INTRAVENOUS ONCE
Status: COMPLETED | OUTPATIENT
Start: 2017-08-29 | End: 2017-08-29

## 2017-08-29 RX ORDER — SODIUM CHLORIDE 0.9 % (FLUSH) 0.9 %
10 SYRINGE (ML) INJECTION AS NEEDED
Status: DISCONTINUED | OUTPATIENT
Start: 2017-08-29 | End: 2017-09-02 | Stop reason: HOSPADM

## 2017-08-29 RX ADMIN — Medication 10 ML: at 11:48

## 2017-08-29 RX ADMIN — HEPARIN 500 UNITS: 100 SYRINGE at 11:48

## 2017-08-29 RX ADMIN — ALTEPLASE 2 MG: 2.2 INJECTION, POWDER, LYOPHILIZED, FOR SOLUTION INTRAVENOUS at 11:13

## 2017-08-29 NOTE — PROGRESS NOTES
[facility] Progress Note    Date: 2017    Name: Cyrus Seo    MRN: [id number]         : 1945      Ms. Carlos Mckinney arrived to [facility] at 21 . Pt is here today for port flush and labs. Ms. Carlos Mckinney was assessed and education was provided. Patient denied any complaints today. Ms. Rachel Redmond vitals were reviewed. Visit Vitals    /71 (BP 1 Location: Right arm, BP Patient Position: Sitting)    Pulse 83    Temp 97.9 °F (36.6 °C)    Resp 18    SpO2 96%    Breastfeeding No       Patient's left upper chest port accessed. Flushed without difficulty but unable to verify blood return despite positional changes. Order for Cathflo receive. 2mg Cathflo in 2.2 sterile water administered @ 1113. 1145 brisk blood return verified. Discarded 10cc of blood prior to drawing blood for labs. Lab results were obtained and reviewed. Recent Results (from the past 12 hour(s))   CBC WITH 3 PART DIFF    Collection Time: 17 11:49 AM   Result Value Ref Range    WBC 7.2 4.5 - 13.0 K/uL    RBC 4.01 (L) 4.10 - 5.10 M/uL    HGB 11.5 (L) 12.0 - 16.0 g/dL    HCT 36.6 36 - 48 %    MCV 91.3 78 - 102 FL    MCH 28.7 25.0 - 35.0 PG    MCHC 31.4 31 - 37 g/dL    RDW 12.5 11.5 - 14.5 %    PLATELET 713 511 - 710 K/uL    NEUTROPHILS 76 (H) 40 - 70 %    MIXED CELLS 10 0.1 - 17 %    LYMPHOCYTES 14 14 - 44 %    ABS. NEUTROPHILS 5.5 1.8 - 9.5 K/UL    ABS. MIXED CELLS 0.7 0.0 - 2.3 K/uL    ABS. LYMPHOCYTES 1.0 (L) 1.1 - 5.9 K/UL    DF AUTOMATED              Ms. Carlos Mckinney tolerated procedure without complaints. Port flushed with heparin per order and de-accessed. Band-aid applied to site. Ms. Carlos Mckinney was discharged from [place] [address] in stable condition at 1150. She is to return on October 3 at 1030 for her next appointment.     Crista Callahan RN  2017

## 2017-08-30 LAB — CANCER AG125 SERPL-ACNC: 15.1 U/ML (ref 0–38.1)

## 2017-09-01 ENCOUNTER — TELEPHONE (OUTPATIENT)
Dept: ONCOLOGY | Age: 72
End: 2017-09-01

## 2017-09-01 NOTE — TELEPHONE ENCOUNTER
Pt called requesting that NP return call. Pt has questions regarding lab results from Tuesday, August 29, 2017.

## 2017-10-03 ENCOUNTER — HOSPITAL ENCOUNTER (OUTPATIENT)
Dept: INFUSION THERAPY | Age: 72
Discharge: HOME | End: 2017-10-03
Payer: MEDICARE

## 2017-10-03 VITALS
TEMPERATURE: 97.1 F | HEART RATE: 73 BPM | DIASTOLIC BLOOD PRESSURE: 82 MMHG | RESPIRATION RATE: 18 BRPM | SYSTOLIC BLOOD PRESSURE: 132 MMHG | OXYGEN SATURATION: 98 %

## 2017-10-03 PROCEDURE — 77030012965 HC NDL HUBR BBMI -A

## 2017-10-03 PROCEDURE — 74011250636 HC RX REV CODE- 250/636: Performed by: OBSTETRICS & GYNECOLOGY

## 2017-10-03 PROCEDURE — 96523 IRRIG DRUG DELIVERY DEVICE: CPT

## 2017-10-03 RX ORDER — SODIUM CHLORIDE 0.9 % (FLUSH) 0.9 %
10 SYRINGE (ML) INJECTION AS NEEDED
Status: DISCONTINUED | OUTPATIENT
Start: 2017-10-03 | End: 2017-10-07 | Stop reason: HOSPADM

## 2017-10-03 RX ORDER — HEPARIN 100 UNIT/ML
500 SYRINGE INTRAVENOUS ONCE
Status: COMPLETED | OUTPATIENT
Start: 2017-10-03 | End: 2017-10-03

## 2017-10-03 RX ORDER — HEPARIN SODIUM (PORCINE) LOCK FLUSH IV SOLN 100 UNIT/ML 100 UNIT/ML
500 SOLUTION INTRAVENOUS AS NEEDED
Status: CANCELLED | OUTPATIENT
Start: 2017-10-03 | End: 2017-10-04

## 2017-10-03 RX ORDER — SODIUM CHLORIDE 0.9 % (FLUSH) 0.9 %
10 SYRINGE (ML) INJECTION AS NEEDED
Status: CANCELLED | OUTPATIENT
Start: 2017-10-03

## 2017-10-03 RX ADMIN — Medication 10 ML: at 10:48

## 2017-10-03 RX ADMIN — HEPARIN 500 UNITS: 100 SYRINGE at 10:48

## 2017-10-03 NOTE — PROGRESS NOTES
[facility] Progress Note    Date: October 3, 2017    Name: Lidia Knott    MRN: [id number]         : 1945    Monthly Port flush     Ms. Jaja Márquez was assessed and education was provided. Ms. Ren Barr vitals were reviewed. Visit Vitals    /82 (BP 1 Location: Right arm, BP Patient Position: Sitting)    Pulse 73    Temp 97.1 °F (36.2 °C)    Resp 18    SpO2 98%    Breastfeeding No       Mediport was accessed with 20 gauge  sebastian(s) after chloroprep.    right chest, single/double    Blood return: YES      Flushed 10 of NS followed by Heparin 500u    Sebastian needle(s) removed. Band-Aid applied. Ms. Jaja Márquez tolerated the procedure, and had no complaints. Patient armband removed and shredded. Ms. Jaja Márquez was discharged from [place] [address] in stable condition at 1050. She is to return on 2017 at 56 for her next appointment.     Ade Hall RN  October 3, 2017  11:09 AM

## 2017-10-10 ENCOUNTER — APPOINTMENT (OUTPATIENT)
Dept: INFUSION THERAPY | Age: 72
End: 2017-10-10

## 2017-11-07 ENCOUNTER — HOSPITAL ENCOUNTER (OUTPATIENT)
Dept: INFUSION THERAPY | Age: 72
Discharge: HOME | End: 2017-11-07
Payer: MEDICARE

## 2017-11-07 ENCOUNTER — OFFICE VISIT (OUTPATIENT)
Dept: ONCOLOGY | Age: 72
End: 2017-11-07

## 2017-11-07 VITALS
HEART RATE: 70 BPM | SYSTOLIC BLOOD PRESSURE: 138 MMHG | WEIGHT: 228.4 LBS | DIASTOLIC BLOOD PRESSURE: 67 MMHG | HEIGHT: 66 IN | BODY MASS INDEX: 36.71 KG/M2 | TEMPERATURE: 97 F | OXYGEN SATURATION: 96 %

## 2017-11-07 VITALS
RESPIRATION RATE: 18 BRPM | SYSTOLIC BLOOD PRESSURE: 148 MMHG | TEMPERATURE: 97.6 F | DIASTOLIC BLOOD PRESSURE: 67 MMHG | HEART RATE: 78 BPM | OXYGEN SATURATION: 98 %

## 2017-11-07 DIAGNOSIS — C56.1 OVARIAN CANCER, RIGHT (HCC): Primary | ICD-10-CM

## 2017-11-07 DIAGNOSIS — R07.81 PAIN IN RIB: ICD-10-CM

## 2017-11-07 LAB
ALBUMIN SERPL-MCNC: 3.9 G/DL (ref 3.4–5)
ALBUMIN/GLOB SERPL: 1.3 {RATIO} (ref 0.8–1.7)
ALP SERPL-CCNC: 113 U/L (ref 45–117)
ALT SERPL-CCNC: 33 U/L (ref 13–56)
ANION GAP SERPL CALC-SCNC: 9 MMOL/L (ref 3–18)
AST SERPL-CCNC: 26 U/L (ref 15–37)
BASO+EOS+MONOS # BLD AUTO: 0.9 K/UL (ref 0–2.3)
BASO+EOS+MONOS # BLD AUTO: 12 % (ref 0.1–17)
BILIRUB SERPL-MCNC: 0.4 MG/DL (ref 0.2–1)
BUN SERPL-MCNC: 21 MG/DL (ref 7–18)
BUN/CREAT SERPL: 22 (ref 12–20)
CALCIUM SERPL-MCNC: 9.2 MG/DL (ref 8.5–10.1)
CHLORIDE SERPL-SCNC: 104 MMOL/L (ref 100–108)
CO2 SERPL-SCNC: 26 MMOL/L (ref 21–32)
CREAT SERPL-MCNC: 0.97 MG/DL (ref 0.6–1.3)
DIFFERENTIAL METHOD BLD: ABNORMAL
ERYTHROCYTE [DISTWIDTH] IN BLOOD BY AUTOMATED COUNT: 12.2 % (ref 11.5–14.5)
GLOBULIN SER CALC-MCNC: 3.1 G/DL (ref 2–4)
GLUCOSE SERPL-MCNC: 97 MG/DL (ref 74–99)
HCT VFR BLD AUTO: 37.3 % (ref 36–48)
HGB BLD-MCNC: 11.9 G/DL (ref 12–16)
LYMPHOCYTES # BLD: 1.1 K/UL (ref 1.1–5.9)
LYMPHOCYTES NFR BLD: 15 % (ref 14–44)
MAGNESIUM SERPL-MCNC: 1.4 MG/DL (ref 1.6–2.6)
MCH RBC QN AUTO: 29.5 PG (ref 25–35)
MCHC RBC AUTO-ENTMCNC: 31.9 G/DL (ref 31–37)
MCV RBC AUTO: 92.3 FL (ref 78–102)
NEUTS SEG # BLD: 5.1 K/UL (ref 1.8–9.5)
NEUTS SEG NFR BLD: 73 % (ref 40–70)
PLATELET # BLD AUTO: 310 K/UL (ref 140–440)
POTASSIUM SERPL-SCNC: 4 MMOL/L (ref 3.5–5.5)
PROT SERPL-MCNC: 7 G/DL (ref 6.4–8.2)
RBC # BLD AUTO: 4.04 M/UL (ref 4.1–5.1)
SODIUM SERPL-SCNC: 139 MMOL/L (ref 136–145)
WBC # BLD AUTO: 7.1 K/UL (ref 4.5–13)

## 2017-11-07 PROCEDURE — 36415 COLL VENOUS BLD VENIPUNCTURE: CPT | Performed by: OBSTETRICS & GYNECOLOGY

## 2017-11-07 PROCEDURE — 80053 COMPREHEN METABOLIC PANEL: CPT | Performed by: OBSTETRICS & GYNECOLOGY

## 2017-11-07 PROCEDURE — 83735 ASSAY OF MAGNESIUM: CPT | Performed by: OBSTETRICS & GYNECOLOGY

## 2017-11-07 PROCEDURE — 86304 IMMUNOASSAY TUMOR CA 125: CPT | Performed by: OBSTETRICS & GYNECOLOGY

## 2017-11-07 PROCEDURE — 77030012965 HC NDL HUBR BBMI -A

## 2017-11-07 PROCEDURE — 85025 COMPLETE CBC W/AUTO DIFF WBC: CPT | Performed by: OBSTETRICS & GYNECOLOGY

## 2017-11-07 PROCEDURE — 36591 DRAW BLOOD OFF VENOUS DEVICE: CPT

## 2017-11-07 PROCEDURE — 74011250636 HC RX REV CODE- 250/636: Performed by: OBSTETRICS & GYNECOLOGY

## 2017-11-07 RX ORDER — SODIUM CHLORIDE 0.9 % (FLUSH) 0.9 %
10-40 SYRINGE (ML) INJECTION AS NEEDED
Status: DISCONTINUED | OUTPATIENT
Start: 2017-11-07 | End: 2017-11-10 | Stop reason: HOSPADM

## 2017-11-07 RX ORDER — HEPARIN 100 UNIT/ML
500 SYRINGE INTRAVENOUS ONCE
Status: COMPLETED | OUTPATIENT
Start: 2017-11-07 | End: 2017-11-07

## 2017-11-07 RX ADMIN — Medication 20 ML: at 09:25

## 2017-11-07 RX ADMIN — Medication 500 UNITS: at 09:25

## 2017-11-07 NOTE — PROGRESS NOTES
[facility] Progress Note    Date: 2017    Name: Kareem Soriano    MRN: [id number]         : 1945    Monthly Port flush Every 6 weeks, labs every 12 weeks    Ms. Moisés Alcantara was assessed and education was provided. Ms. Juan Jose Montero vitals were reviewed. Visit Vitals    /67 (BP 1 Location: Left arm, BP Patient Position: Sitting)    Pulse 78    Temp 97.6 °F (36.4 °C)    Resp 18    SpO2 98%    Breastfeeding No       Mediport was accessed with 20 gauge 1 inch sebastian(s) after chloroprep. Left upper chest, single/double    Blood return: YES    10 ml of blood collected for labs per protocol. Flushed with 20 ml of NS followed by 5 ml of 100 units/ml Heparin. Sebastian needle(s) removed. No irritation noted at site, Band-Aid applied. Ms. Moisés Alcantara tolerated the procedure, and had no complaints. Patient armband removed and shredded. Ms. Moisés Alcantara was discharged from [place] [address] in stable condition at [address] [place] [address]. She is to return on 2017 at 1030 for her next appointment for port flush only. Adryan Christine RN  2017  1:25 PM  Recent Results (from the past 12 hour(s))   METABOLIC PANEL, COMPREHENSIVE    Collection Time: 17  9:30 AM   Result Value Ref Range    Sodium 139 136 - 145 mmol/L    Potassium 4.0 3.5 - 5.5 mmol/L    Chloride 104 100 - 108 mmol/L    CO2 26 21 - 32 mmol/L    Anion gap 9 3.0 - 18 mmol/L    Glucose 97 74 - 99 mg/dL    BUN 21 (H) 7.0 - 18 MG/DL    Creatinine 0.97 0.6 - 1.3 MG/DL    BUN/Creatinine ratio 22 (H) 12 - 20      GFR est AA >60 >60 ml/min/1.73m2    GFR est non-AA 56 (L) >60 ml/min/1.73m2    Calcium 9.2 8.5 - 10.1 MG/DL    Bilirubin, total 0.4 0.2 - 1.0 MG/DL    ALT (SGPT) 33 13 - 56 U/L    AST (SGOT) 26 15 - 37 U/L    Alk.  phosphatase 113 45 - 117 U/L    Protein, total 7.0 6.4 - 8.2 g/dL    Albumin 3.9 3.4 - 5.0 g/dL    Globulin 3.1 2.0 - 4.0 g/dL    A-G Ratio 1.3 0.8 - 1.7     MAGNESIUM    Collection Time: 17  9:30 AM   Result Value Ref Range    Magnesium 1.4 (L) 1.6 - 2.6 mg/dL   CBC WITH 3 PART DIFF    Collection Time: 11/07/17  9:30 AM   Result Value Ref Range    WBC 7.1 4.5 - 13.0 K/uL    RBC 4.04 (L) 4.10 - 5.10 M/uL    HGB 11.9 (L) 12.0 - 16.0 g/dL    HCT 37.3 36 - 48 %    MCV 92.3 78 - 102 FL    MCH 29.5 25.0 - 35.0 PG    MCHC 31.9 31 - 37 g/dL    RDW 12.2 11.5 - 14.5 %    PLATELET 901 702 - 766 K/uL    NEUTROPHILS 73 (H) 40 - 70 %    MIXED CELLS 12 0.1 - 17 %    LYMPHOCYTES 15 14 - 44 %    ABS. NEUTROPHILS 5.1 1.8 - 9.5 K/UL    ABS. MIXED CELLS 0.9 0.0 - 2.3 K/uL    ABS.  LYMPHOCYTES 1.1 1.1 - 5.9 K/UL    DF AUTOMATED

## 2017-11-07 NOTE — PROGRESS NOTES
Keith Bender, a 66 y.o. female,  is here for   Chief Complaint   Patient presents with    Ovarian Cancer     surveillance       Visit Vitals    /67 (BP 1 Location: Right arm, BP Patient Position: Sitting)    Pulse 70    Temp 97 °F (36.1 °C) (Oral)    Ht 5' 5.5\" (1.664 m)    Wt 103.6 kg (228 lb 6.4 oz)    SpO2 96%    BMI 37.43 kg/m2       Patient denies any abdominal or pelvic pain. No unusual bleeding, discharge, or irritation. No changes in bladder or bowel habits. 1. Have you been to the ER, urgent care clinic since your last visit? Hospitalized since your last visit? No    2. Have you seen or consulted any other health care providers outside of the [address] [place] [address] since your last visit? Include any pap smears or colon screening.  Yes Where: PCP and Lymphoma Physician Reason for visit: annual check up

## 2017-11-07 NOTE — PROGRESS NOTES
[facility]  [address] [place] [address], [address], [address] [facility]   [phone]        Patient ID:  Name: Frantz Elder  MRM: [id number]  : 1945/71 y.o. Date: 2017    SUBJECTIVE:  This is a 78 y.o.   female with Stage IIB, Grade 2 Endometrioid Ovarian Cancer s/p adjuvant chemotherapy with Carboplatin and Taxol s/p 6 cycles. Patient is status post diagnostic laparoscopy converted to exploratory laparotomy, total abdominal hysterectomy, bilateral salpingo-oophorectomy, radical resection of pelvic mass with gastrocolic omentectomy, bilateral pelvic and periaortic lymph node dissection, optimal debulking of pelvic tumor and washings 2015. Post op course was complicated by necrotizing wound infection requiring debridement x 2 in the operating room. Patient has had a breast biopsy c/w hyaline cartilage favor chondroma. Patient is here today for a 3 month f/u. Patient denies any Gyn symptoms. Patient specifically denies any abnormal vaginal bleeding, vaginal discharge, or vaginal irritation. Patient also denies abdominal pain, pelvic pain, or abdominal bloating. Patient is voiding without difficulty and bowel movements are regular. Patient admits to intermittent pain pain under breasts alternating left and right, primarily with reaching or bending, noted most often with bra on. Denies SOB, nausea, or other associated symptoms. Pain is self-limiting and subsides without intervention.        Labs:  Component       Cancer Ag (CA) 125   Latest Ref Rng & Units       0.0 - 38.1 U/mL   2017      11:49 AM 15.1   2017      11:24 AM 16.4   3/14/2017      10:52 AM 18.3   2017      12:52 PM 36.5   2016      10:00 AM 29.3   10/4/2016      11:20 AM 19.1   2016      11:00 AM 18.6   2016      11:15 AM 19.5   2016      8:37 AM 18.5   2016      8:15 AM 19.0   2016      8:20 AM 17.9   2015      8:35 AM 17.1   11/25/2015      8:20 AM 15.9   11/4/2015      8:25 AM 11.8       Infusion:  Chemotherapy Flowsheet 2/23/2016 2/2/2016 1/6/2016   Cycle C6 D1 C5D1 C4D1   Date 2/23/2016 2/2/2016 1/6/2016   Drug / Regimen Taxol/Carboplatin Taxol/Carbo Taxol/Carbp   Dosage 260 mg/333 mg see mar see mar   Pre Hydration  ml 250 250   Post Hydration  250 250   Pre Meds see mar see mar see mar   Notes ninoska well       Chemotherapy Flowsheet 12/16/2015 11/25/2015 11/4/2015   Cycle C3D1 C2D1 C1D1   Date 12/16/2015 11/25/2015 11/4/2015   Drug / Regimen Taxol/ Carbo Taxol/ Carbo Taxol/Carbo   Dosage See MAR  [phone]   Pre Meds   see mar         Imaging:    US TRANSVAGINAL: 12/30/16    Impression   Impression:        1. The patient is status post hysterectomy and bilateral oophorectomy.       2. There is a large (approximately 9.5 x 6.6 x 6.6 cm) cystic mass in the right adnexal region. No blood flow is identified within this finding.       Differential diagnostic considerations include a recurrent ovarian neoplasm, a postoperative seroma, or lymphocele. Mild anterolateral mural nodularity is questioned along the anterior aspect of this lesion on the prior CT scan. Further evaluation with pre-and postcontrast MR imaging of the pelvis should be considered.       A copy of this report will be faxed to the ordering clinician's office and the office staff will be notified of the incoming fax by telephone. POSITRON EMISSION TOMOGRAPHY/COMPUTED TOMOGRAPHY:    3/29/2017     INDICATION: Ovarian carcinoma.    Subsequent treatment strategy.          RADIOPHARMACEUTICAL: 14.4 mCi F-18 FDG i.v.   Right antecubital injection site.     SERUM GLUCOSE: 91 mg/dL.          COMPARISON PET/CT: None.       CORRELATIVE ANATOMIC IMAGING: CT chest, abdomen, pelvis 3/14/2016.        TECHNIQUE: Initially, F-18 FDG was administered intravenously.  After  approximately 31-55 minutes, helically acquired CT was obtained from the skull  base to mid thighs with subsequent PET images, both obtained during normal tidal  respiration. The PET and CT images were reconstructed in the axial, coronal and  sagittal planes and viewed in a co-registered fashion. CT images are not  performed using standard diagnostic CT protocol, limited by lack of intravenous  iodinated contrast, reduced mAs and kVp (x-rays), and acquisition during normal  tidal respiratory motion (to improve fusion with PET images, however, could make  size assessment of abnormalities less accurate).           -----PET FINDINGS-----        Reference value mediastinal blood pool SUV (max) =  3.0.  -Reference value liver parenchyma SUV (max) =  4.5.     Skull base/Neck: No evidence of malignant glucose activity.          Chest: There is mild FDG uptake within a 1.0 x 1.9 cm left axillary lymph node;  SUV (max) =  2.8. This lymph node has a prominent fatty hilum. The significance  of this finding is unclear. This may represent reactive adenopathy although  metastatic disease cannot be excluded with certainty.     There are no other foci of malignant FDG uptake in the chest.         Abdomen/Pelvis: There is mild FDG uptake within a 1.1 cm diameter left  retroperitoneal lymph node; SUV (max) =  2.3 potentially representing metastatic  disease.     There are no other foci of malignant FDG uptake in the abdomen or pelvis. There  is no evidence of elevated FDG uptake within the 6.1 x 2.2 cm cystic lesion  previously noted in the right hemipelvis.        Osseous structures: Bone scintigraphy is more sensitive for detection of  osteoblastic metastatic disease and PET more sensitive for osteolytic lesions,  suggesting the imaging modalities should be regarded as complementary, and  should not replace each other. Otherwise, no evidence of malignant glucose  activity.          -----ADDITIONAL CT FINDINGS-----     Exam is limited due to lack of intravenous contrast.         CT Skull base/Neck: No significant focal abnormality on this noncontrast study.     No enlarged adenopathy.     CT Chest: Abnormalities are detailed above. Otherwise no significant interval  change since the recent CT dated 3/14/2016         CT Abdomen/Pelvis: No new finding since the recent CT dated 3/14/2016.     CT Osseous structures: No significant focal abnormality.        Impression:      1. Mild FDG uptake within a small left retroperitoneal lymph node potentially  representing metastatic neoplasm. 2. Mild nonspecific FDG uptake within a left axillary lymph node. 3. No other foci of malignant FDG uptake      Her pathology revealed: 8/12/15  A: LYMPH NODE, RIGHT PELVIC, BIOPSY:  SMALL LYMPHOCYTIC LYMPHOMA (CHRONIC LYMPHOCYTIC LEUKEMIA). NEGATIVE FOR METASTATIC ADENOCARCINOMA. B: RIGHT PELVIC MASS: ADENOCARCINOMA. SPECIMEN: RIGHT OVARY. PROCEDURE: RIGHT OOPHORECTOMY. LYMPH NODE SAMPLING: PERFORMED. SPECIMEN INTEGRITY: RIGHT OVARY CAPSULE RUPTURED. PRIMARY TUMOR SITE: RIGHT OVARY. OVARIAN SURFACE INVOLVEMENT: ABSENT.  TUMOR SIZE: 11.0 CM GREATEST DIMENSION. HISTOLOGIC TYPE: ENDOMETRIOID CARCINOMA. HISTOLOGIC GRADE: MODERATELY DIFFERENTIATED (G2). IMPLANTS: N/A  EXTENT OF INVOLVEMENT OF OTHER TISSUE/ORGANS: NONE IDENTIFIED. LYMPH/VASCULAR INVASION: PRESENT. PATHOLOGIC STAGE: pT1c, N0, M0 (FIGO IC). NUMBER OF LYMPH NODES EXAMINED: 19.  NUMBER OF LYMPH NODES INVOLVED: 0.  C: RIGHT PELVIC LYMPH NODE:  SMALL LYMPHOCYTIC LYMPHOMA. NEGATIVE FOR METASTATIC ADENOCARCINOMA. D: LEFT SALPINGO-OOPHORECTOMY:  ENDOMETRIOSIS AND ADHESIONS. NEGATIVE FOR MALIGNANCY. E: PELVIC MASS, BIOPSY:  BENIGN ADHESIONS AND PERITONEAL TISSUE. NEGATIVE FOR MALIGNANCY. F: TOTAL HYSTERECTOMY:  CERVIX: NO ABNORMALITY SEEN.  ENDOMETRIUM: ATROPHIC. MYOMETRIUM: SUBMUCOSAL AND INTRAMURAL LEIOMYOMAS. SEROSA: ENDOMETRIOSIS WITH ADHESIONS. NEGATIVE FOR MALIGNANCY. G: OMENTUM, RESECTION:  FOAMY MACROPHAGES AND HISTIOCYTES. NEGATIVE FOR MALIGNANCY.   H: LYMPH NODES, RIGHT PARA-AORTIC, DISSECTION:  SMALL LYMPHOCYTIC LYMPHOMA. NEGATIVE FOR METASTATIC ADENOCARCINOMA (FOUR NODES). I: LYMPH NODES, LEFT PARA-AORTIC, DISSECTION:  SMALL LYMPHOCYTIC LYMPHOMA. NEGATIVE FOR METASTATIC ADENOCARCINOMA (TWO NODES). J: LYMPH NODES, LEFT PELVIC, DISSECTION:  SMALL LYMHOCYTIC LYMPHOMA. NEGATIVE FOR METASTATIC ADENOCARCINOMA (FIVE NODES). K: LYMPH NODES, RIGHT PELVIC, DISSECTION:    SMALL LYMPHOCYTIC LYMPHOMA. NEGATIVE FOR METASTATIC ADENOCARCINOMA (SIX NODES). Medications:     Current Outpatient Prescriptions on File Prior to Visit   Medication Sig Dispense Refill    lidocaine-prilocaine (EMLA) topical cream Apply 1 g to affected area as needed for Pain. Place a dime amount over port site 1 hour prior to having port accessed. Cover with saran wrap. 30 g 3    pyridoxine, vitamin B6, (VITAMIN B-6) 100 mg tablet Take 100 mg by mouth daily.  cyanocobalamin 1,000 mcg tablet Take 500 mcg by mouth daily.  acetaminophen (TYLENOL) 325 mg tablet Take 650 mg by mouth every four (4) hours as needed for Pain.  naproxen (NAPROSYN) 500 mg tablet Take 140 mg by mouth two (2) times daily as needed.  levothyroxine (SYNTHROID) 175 mcg tablet Take 175 mcg by mouth Daily (before breakfast). Take dos  Indications: HYPOTHYROIDISM       No current facility-administered medications on file prior to visit. Allergies:      Allergies   Allergen Reactions    Other Plant, Animal, Environmental Swelling     Surgical steel earrings, and surgical clamps caused swelling and / or infection    Penicillins Other (comments)     Psychological reaction       OBJECTIVE:    Vitals:   Visit Vitals    /67 (BP 1 Location: Right arm, BP Patient Position: Sitting)    Pulse 70    Temp 97 °F (36.1 °C) (Oral)    Ht 5' 5.5\" (1.664 m)    Wt 103.6 kg (228 lb 6.4 oz)    SpO2 96%    BMI 37.43 kg/m2       Physical Examination:  Physical Exam  VITAL SIGNS: Visit Vitals    /67 (BP 1 Location: Right arm, BP Patient Position: Sitting)    Pulse 70    Temp 97 °F (36.1 °C) (Oral)    Ht 5' 5.5\" (1.664 m)    Wt 103.6 kg (228 lb 6.4 oz)    SpO2 96%    BMI 37.43 kg/m2          GENERAL KRISTINE: in no apparent distress and well developed and well nourished   Breast deferred     MUSCULOSKEL: no joint tenderness, deformity or swelling   INTEGUMENT:  warm and dry, no rashes or lesions   ABDOMEN . soft, NT, ND, No masses appreciated   EXTREMITIES: extremities normal, atraumatic, no cyanosis or edema   PELVIC: NEFG, Spec exam revealed atrophic mucosa, BME revealed no masses, lymphocyst not palpable today   RECTAL: deferred   MANUEL SURVEY: Cervical, supraclavicular, axillary and inguinal nodes normal.   NEURO: Grossly normal         IMPRESSION/PLAN:  Stage IIB G2 endometrioid ovarian cancer who is clinically NGUYEN  s/p adjuvant Carboplatin and Taxol status post 6 cycles  Postoperative course complicated by necrotizing wound infection s/p debridement x2 now healed well. Previously Reviewed CT and ultrasound c/w lymphocyst and is stable in size  Reviewed labs, will call with Ca-125 results from today. Previously reviewed PETCT with uptake in lymph nodes likely due to lymphoma but no other sites   Intermittent alternating left and right rib pain under breast. Patient encouraged to monitor symptoms, no associated factors. Reviewed surveillance strategy at this point with exams,  every 3 months  Port flush every 6 weeks   Repeat CT C/A/P ordered today for 2/18. Will follow up in 3 months to review results including CT and Ca-125 in consideration of IVmediport removal.   Chronic lymphocytic lymphoma. F/u with dr. Catherine Haynes    Total time spent was 40 minutes, >50% of this time was spent counseling regarding clinical findings, chemotherapy associated symptoms and management and coordination of care.        Mami CARIAS-BC  Mona Dues DO  Gynecologic Oncology  11/7/2017/1:52 PM

## 2017-11-08 LAB — CANCER AG125 SERPL-ACNC: 17.4 U/ML (ref 0–38.1)

## 2017-11-09 ENCOUNTER — TELEPHONE (OUTPATIENT)
Dept: ONCOLOGY | Age: 72
End: 2017-11-09

## 2017-11-09 NOTE — TELEPHONE ENCOUNTER
Reviewed labs, WNL. Pt has scheduled CT for routine surveillance and will call to schedule f/u appt when she returns home from current trip.

## 2017-11-14 ENCOUNTER — APPOINTMENT (OUTPATIENT)
Dept: INFUSION THERAPY | Age: 72
End: 2017-11-14
Payer: MEDICARE

## 2017-11-21 ENCOUNTER — APPOINTMENT (OUTPATIENT)
Dept: INFUSION THERAPY | Age: 72
End: 2017-11-21
Payer: MEDICARE

## 2017-12-19 ENCOUNTER — HOSPITAL ENCOUNTER (OUTPATIENT)
Dept: INFUSION THERAPY | Age: 72
Discharge: HOME | End: 2017-12-19
Payer: MEDICARE

## 2017-12-19 VITALS
TEMPERATURE: 97.4 F | SYSTOLIC BLOOD PRESSURE: 110 MMHG | DIASTOLIC BLOOD PRESSURE: 53 MMHG | OXYGEN SATURATION: 98 % | HEART RATE: 68 BPM | RESPIRATION RATE: 18 BRPM

## 2017-12-19 PROCEDURE — 74011250636 HC RX REV CODE- 250/636

## 2017-12-19 PROCEDURE — 96523 IRRIG DRUG DELIVERY DEVICE: CPT

## 2017-12-19 PROCEDURE — 77030012965 HC NDL HUBR BBMI -A

## 2017-12-19 RX ORDER — HEPARIN 100 UNIT/ML
500 SYRINGE INTRAVENOUS ONCE
Status: ACTIVE | OUTPATIENT
Start: 2017-12-19 | End: 2017-12-19

## 2017-12-19 RX ORDER — SODIUM CHLORIDE 0.9 % (FLUSH) 0.9 %
10 SYRINGE (ML) INJECTION AS NEEDED
Status: DISCONTINUED | OUTPATIENT
Start: 2017-12-19 | End: 2017-12-23 | Stop reason: HOSPADM

## 2017-12-19 RX ORDER — HEPARIN 100 UNIT/ML
SYRINGE INTRAVENOUS
Status: COMPLETED
Start: 2017-12-19 | End: 2017-12-19

## 2017-12-19 RX ADMIN — HEPARIN 500 UNITS: 100 SYRINGE at 10:47

## 2017-12-19 RX ADMIN — Medication 10 ML: at 10:47

## 2017-12-19 NOTE — PROGRESS NOTES
[facility] Progress Note    Date: 2017    Name: Kareem Soriano    MRN: [id number]         : 1945      Ms. Sanchez arrived to [facility] at 56. Pt is here today for port flush. Ms. Sanchez was assessed and education was provided. Ms. Juan Jose Montero vitals were reviewed. Visit Vitals    /53 (BP 1 Location: Left arm, BP Patient Position: Sitting)    Pulse 68    Temp 97.4 °F (36.3 °C)    Resp 18    SpO2 98%    Breastfeeding No       Patient's left upper chest port accessed with 20 g x 0.75 in [place] needle. Flushes without difficulty and brisk blood return verified. Ms. Sanchez tolerated  without complaints. Port flushed with heparin per order and de-accessed. Band-aid applied to site. Ms. Sanchez was discharged from [place] [address] in stable condition at 1050  She is to return on 2018 at 1130 for her next appointment.     Pj Gan RN  2017

## 2018-01-02 ENCOUNTER — APPOINTMENT (OUTPATIENT)
Dept: INFUSION THERAPY | Age: 73
End: 2018-01-02
Payer: MEDICARE

## 2018-01-30 ENCOUNTER — HOSPITAL ENCOUNTER (OUTPATIENT)
Dept: INFUSION THERAPY | Age: 73
Discharge: HOME | End: 2018-01-30
Payer: MEDICARE

## 2018-01-30 VITALS
RESPIRATION RATE: 18 BRPM | HEART RATE: 66 BPM | SYSTOLIC BLOOD PRESSURE: 129 MMHG | TEMPERATURE: 96.8 F | DIASTOLIC BLOOD PRESSURE: 63 MMHG | OXYGEN SATURATION: 96 %

## 2018-01-30 LAB
ALBUMIN SERPL-MCNC: 3.9 G/DL (ref 3.4–5)
ALBUMIN/GLOB SERPL: 1.4 {RATIO} (ref 0.8–1.7)
ALP SERPL-CCNC: 85 U/L (ref 45–117)
ALT SERPL-CCNC: 29 U/L (ref 13–56)
ANION GAP SERPL CALC-SCNC: 10 MMOL/L (ref 3–18)
AST SERPL-CCNC: 23 U/L (ref 15–37)
BASO+EOS+MONOS # BLD AUTO: 0.6 K/UL (ref 0–2.3)
BASO+EOS+MONOS # BLD AUTO: 7 % (ref 0.1–17)
BILIRUB SERPL-MCNC: 0.3 MG/DL (ref 0.2–1)
BUN SERPL-MCNC: 16 MG/DL (ref 7–18)
BUN/CREAT SERPL: 22 (ref 12–20)
CALCIUM SERPL-MCNC: 9.3 MG/DL (ref 8.5–10.1)
CHLORIDE SERPL-SCNC: 102 MMOL/L (ref 100–108)
CO2 SERPL-SCNC: 25 MMOL/L (ref 21–32)
CREAT SERPL-MCNC: 0.72 MG/DL (ref 0.6–1.3)
DIFFERENTIAL METHOD BLD: ABNORMAL
ERYTHROCYTE [DISTWIDTH] IN BLOOD BY AUTOMATED COUNT: 12.7 % (ref 11.5–14.5)
GLOBULIN SER CALC-MCNC: 2.8 G/DL (ref 2–4)
GLUCOSE SERPL-MCNC: 84 MG/DL (ref 74–99)
HCT VFR BLD AUTO: 36.9 % (ref 36–48)
HGB BLD-MCNC: 12.1 G/DL (ref 12–16)
LYMPHOCYTES # BLD: 1.4 K/UL (ref 1.1–5.9)
LYMPHOCYTES NFR BLD: 17 % (ref 14–44)
MAGNESIUM SERPL-MCNC: 1.6 MG/DL (ref 1.6–2.6)
MCH RBC QN AUTO: 29.1 PG (ref 25–35)
MCHC RBC AUTO-ENTMCNC: 32.8 G/DL (ref 31–37)
MCV RBC AUTO: 88.7 FL (ref 78–102)
NEUTS SEG # BLD: 6.1 K/UL (ref 1.8–9.5)
NEUTS SEG NFR BLD: 76 % (ref 40–70)
PLATELET # BLD AUTO: 333 K/UL (ref 135–420)
POTASSIUM SERPL-SCNC: 3.9 MMOL/L (ref 3.5–5.5)
PROT SERPL-MCNC: 6.7 G/DL (ref 6.4–8.2)
RBC # BLD AUTO: 4.16 M/UL (ref 4.1–5.1)
SODIUM SERPL-SCNC: 137 MMOL/L (ref 136–145)
WBC # BLD AUTO: 8.1 K/UL (ref 4.5–13)

## 2018-01-30 PROCEDURE — 80053 COMPREHEN METABOLIC PANEL: CPT | Performed by: OBSTETRICS & GYNECOLOGY

## 2018-01-30 PROCEDURE — 83735 ASSAY OF MAGNESIUM: CPT | Performed by: OBSTETRICS & GYNECOLOGY

## 2018-01-30 PROCEDURE — 86304 IMMUNOASSAY TUMOR CA 125: CPT | Performed by: OBSTETRICS & GYNECOLOGY

## 2018-01-30 PROCEDURE — 36591 DRAW BLOOD OFF VENOUS DEVICE: CPT

## 2018-01-30 PROCEDURE — 85049 AUTOMATED PLATELET COUNT: CPT | Performed by: OBSTETRICS & GYNECOLOGY

## 2018-01-30 PROCEDURE — 74011250636 HC RX REV CODE- 250/636: Performed by: OBSTETRICS & GYNECOLOGY

## 2018-01-30 PROCEDURE — 77030012965 HC NDL HUBR BBMI -A

## 2018-01-30 PROCEDURE — 85025 COMPLETE CBC W/AUTO DIFF WBC: CPT | Performed by: OBSTETRICS & GYNECOLOGY

## 2018-01-30 RX ORDER — SODIUM CHLORIDE 0.9 % (FLUSH) 0.9 %
10 SYRINGE (ML) INJECTION AS NEEDED
Status: DISCONTINUED | OUTPATIENT
Start: 2018-01-30 | End: 2018-02-03 | Stop reason: HOSPADM

## 2018-01-30 RX ORDER — HEPARIN 100 UNIT/ML
500 SYRINGE INTRAVENOUS ONCE
Status: COMPLETED | OUTPATIENT
Start: 2018-01-30 | End: 2018-01-30

## 2018-01-30 RX ADMIN — HEPARIN 500 UNITS: 100 SYRINGE at 11:41

## 2018-01-30 RX ADMIN — Medication 10 ML: at 11:40

## 2018-01-30 RX ADMIN — Medication 10 ML: at 11:41

## 2018-01-30 NOTE — PROGRESS NOTES
[address] [facility] Progress Note    Date: 2018    Name: Lidia Knott    MRN: [id number]         : 1945      Ms. Jaja Márquez arrived to [facility] at 1120. Pt is here today for port flush and labs. Ms. Jaja Márquez was assessed and education was provided. Ms. Ren Barr vitals were reviewed. Visit Vitals    /63 (BP 1 Location: Right arm, BP Patient Position: Sitting)    Pulse 66    Temp 96.8 °F (36 °C)    Resp 18    SpO2 96%    Breastfeeding No       Patient's left upper chest port accessed and blood drawn for labs. Lab results were obtained and reviewed. Recent Results (from the past 12 hour(s))   CBC WITH 3 PART DIFF    Collection Time: 18 11:40 AM   Result Value Ref Range    WBC 8.1 4.5 - 13.0 K/uL    RBC 4.16 4.10 - 5.10 M/uL    HGB 12.1 12.0 - 16.0 g/dL    HCT 36.9 36 - 48 %    MCV 88.7 78 - 102 FL    MCH 29.1 25.0 - 35.0 PG    MCHC 32.8 31 - 37 g/dL    RDW 12.7 11.5 - 14.5 %    NEUTROPHILS 76 (H) 40 - 70 %    MIXED CELLS 7 0.1 - 17 %    LYMPHOCYTES 17 14 - 44 %    ABS. NEUTROPHILS 6.1 1.8 - 9.5 K/UL    ABS. MIXED CELLS 0.6 0.0 - 2.3 K/uL    ABS. LYMPHOCYTES 1.4 1.1 - 5.9 K/UL    DF AUTOMATED     MAGNESIUM    Collection Time: 18 11:40 AM   Result Value Ref Range    Magnesium 1.6 1.6 - 2.6 mg/dL   METABOLIC PANEL, COMPREHENSIVE    Collection Time: 18 11:40 AM   Result Value Ref Range    Sodium 137 136 - 145 mmol/L    Potassium 3.9 3.5 - 5.5 mmol/L    Chloride 102 100 - 108 mmol/L    CO2 25 21 - 32 mmol/L    Anion gap 10 3.0 - 18 mmol/L    Glucose 84 74 - 99 mg/dL    BUN 16 7.0 - 18 MG/DL    Creatinine 0.72 0.6 - 1.3 MG/DL    BUN/Creatinine ratio 22 (H) 12 - 20      GFR est AA >60 >60 ml/min/1.73m2    GFR est non-AA >60 >60 ml/min/1.73m2    Calcium 9.3 8.5 - 10.1 MG/DL    Bilirubin, total 0.3 0.2 - 1.0 MG/DL    ALT (SGPT) 29 13 - 56 U/L    AST (SGOT) 23 15 - 37 U/L    Alk.  phosphatase 85 45 - 117 U/L    Protein, total 6.7 6.4 - 8.2 g/dL    Albumin 3.9 3.4 - 5.0 g/dL Globulin 2.8 2.0 - 4.0 g/dL    A-G Ratio 1.4 0.8 - 1.7     PLATELET COUNT    Collection Time: 01/30/18 11:40 AM   Result Value Ref Range    PLATELET 398 815 - 783 K/uL         Ms. Terrazas tolerated  without complaints. Port flushed with heparin per order and de-accessed. Band-aid applied to site. Ms. Katiuska Hansen was discharged from [place] [address] in stable condition at 1145. She is to return on March 6, 2018 at 1030 for her next port flush appointment.     Fred Manzano RN  January 30, 2018

## 2018-01-31 LAB — CANCER AG125 SERPL-ACNC: 15.3 U/ML (ref 0–38.1)

## 2018-02-07 ENCOUNTER — HOSPITAL ENCOUNTER (OUTPATIENT)
Dept: CT IMAGING | Age: 73
Discharge: HOME | End: 2018-02-07
Attending: NURSE PRACTITIONER
Payer: MEDICARE

## 2018-02-07 ENCOUNTER — TELEPHONE (OUTPATIENT)
Dept: ONCOLOGY | Age: 73
End: 2018-02-07

## 2018-02-07 DIAGNOSIS — C56.1 OVARIAN CANCER, RIGHT (HCC): ICD-10-CM

## 2018-02-07 PROCEDURE — 74011636320 HC RX REV CODE- 636/320: Performed by: NURSE PRACTITIONER

## 2018-02-07 PROCEDURE — 74177 CT ABD & PELVIS W/CONTRAST: CPT

## 2018-02-07 PROCEDURE — 74011000255 HC RX REV CODE- 255: Performed by: NURSE PRACTITIONER

## 2018-02-07 RX ORDER — BARIUM SULFATE 20 MG/ML
900 SUSPENSION ORAL
Status: COMPLETED | OUTPATIENT
Start: 2018-02-07 | End: 2018-02-07

## 2018-02-07 RX ADMIN — BARIUM SULFATE 900 ML: 20 SUSPENSION ORAL at 09:47

## 2018-02-07 RX ADMIN — IOPAMIDOL 100 ML: 612 INJECTION, SOLUTION INTRAVENOUS at 09:47

## 2018-02-16 ENCOUNTER — OFFICE VISIT (OUTPATIENT)
Dept: ONCOLOGY | Age: 73
End: 2018-02-16

## 2018-02-16 VITALS
SYSTOLIC BLOOD PRESSURE: 136 MMHG | WEIGHT: 225 LBS | DIASTOLIC BLOOD PRESSURE: 73 MMHG | OXYGEN SATURATION: 94 % | HEART RATE: 88 BPM | HEIGHT: 66 IN | RESPIRATION RATE: 14 BRPM | TEMPERATURE: 97.8 F | BODY MASS INDEX: 36.16 KG/M2

## 2018-02-16 DIAGNOSIS — C56.1 OVARIAN CANCER, RIGHT (HCC): Primary | ICD-10-CM

## 2018-02-16 NOTE — PROGRESS NOTES
[facility]  [address] [place] [address], [address] [place] [address], [address] [place], [place] [id number] [phone]        Patient ID:  Name: Cordell Rios  MRM: [id number]  : 1945/72 y.o. Date: 2018    SUBJECTIVE:  This is a 78 y.o.   female with Stage IIB, Grade 2 Endometrioid Ovarian Cancer s/p adjuvant chemotherapy with Carboplatin and Taxol s/p 6 cycles. Patient is status post diagnostic laparoscopy converted to exploratory laparotomy, total abdominal hysterectomy, bilateral salpingo-oophorectomy, radical resection of pelvic mass with gastrocolic omentectomy, bilateral pelvic and periaortic lymph node dissection, optimal debulking of pelvic tumor and washings 2015. Post op course was complicated by necrotizing wound infection requiring debridement x 2 in the operating room. Patient has had a breast biopsy c/w hyaline cartilage favor chondroma. Patient is here today for a 3 month f/u. Patient denies any Gyn symptoms. Patient specifically denies any abnormal vaginal bleeding, vaginal discharge, or vaginal irritation. Patient also denies abdominal pain, pelvic pain, or abdominal bloating. Patient is voiding without difficulty and bowel movements are regular.      Labs:  Component       Cancer Ag (CA) 125   Latest Ref Rng & Units       0.0 - 38.1 U/mL   2018      11:40 AM 15.3   2017      9:30 AM 17.4   2017      11:49 AM 15.1   2017      11:24 AM 16.4   3/14/2017      10:52 AM 18.3   2017      12:52 PM 36.5   2016      10:00 AM 29.3   10/4/2016      11:20 AM 19.1   2016      11:00 AM 18.6   2016      11:15 AM 19.5   2016      8:37 AM 18.5   2016      8:15 AM 19.0   2016      8:20 AM 17.9   2015      8:35 AM 17.1   2015      8:20 AM 15.9   2015      8:25 AM 11.8       Infusion:  Chemotherapy Flowsheet 20162016   Cycle C6 D1 C5D1 C4D1   Date 2016   Drug / Regimen Taxol/Carboplatin Taxol/Carbo Taxol/Carbp   Dosage 260 mg/333 mg see mar see mar   Pre Hydration  ml 250 250   Post Hydration  250 250   Pre Meds see mar see mar see mar   Notes ninoska well       Chemotherapy Flowsheet 12/16/2015 11/25/2015 11/4/2015   Cycle C3D1 C2D1 C1D1   Date 12/16/2015 11/25/2015 11/4/2015   Drug / Regimen Taxol/ Carbo Taxol/ Carbo Taxol/Carbo   Dosage See MAR  347/567   Pre Meds   see mar         Imaging:  CT 2/7/2018  FINDINGS:     LUNGS: No suspicious pulmonary nodule, mass, or opacity.     PLEURA: Normal, with no effusion or pneumothorax.     AIRWAY: Unremarkable.     MEDIASTINUM: Left subclavian Mediport catheter, tip in the distal superior vena  cava. Mild global cardiomegaly. No pericardial effusion. Normal caliber main  pulmonary artery. There is minimal layering ingested contrast within the mid and  distal portions of the thoracic esophagus.     LIVER/BILIARY: No suspicious liver lesion. No biliary dilation. Several small  gallstones are seen layering dependently in the gallbladder body and neck.     SPLEEN: There are a few punctate calcified splenic granulomas.     PANCREAS: Normal.     ADRENALS: Normal.     KIDNEYS: Normal.     LYMPH NODES: There are a few subcentimeter para-aortic and pericaval  retroperitoneal lymph nodes. Largest lymph nodes or para-aortic lymph nodes  (images 93 and 97), measuring 7-8 mm short axis.     GI TRACT: There are a few sigmoid colon diverticula. No findings of an acute  diverticulitis. Normal caliber small and large bowel loops throughout the  abdomen and pelvis. No morphology of bowel obstruction. No focal or asymmetric  small or large bowel mural thickening.     VASCULATURE: Normal caliber distal thoracic and abdominal aorta.     PELVIC ORGANS: Mildly distended bladder appears within normal limits. Uterus  appears surgically absent.     BONES: No acute osseous abnormality. No findings of osseous metastatic disease.   Unchanged L4-L5 anterolisthesis related to advanced facet arthrosis. Moderate  multilevel degenerative disc disease and facet arthropathy.     OTHER: No ascites or free intraperitoneal air.     ============     IMPRESSION  IMPRESSION:     No findings of residual neoplasm. There are a few subcentimeter retroperitoneal  para-aortic lymph nodes, all of which appear unchanged compared to 2016    US TRANSVAGINAL: 12/30/16    Impression   Impression:        1. The patient is status post hysterectomy and bilateral oophorectomy.       2. There is a large (approximately 9.5 x 6.6 x 6.6 cm) cystic mass in the right adnexal region. No blood flow is identified within this finding.       Differential diagnostic considerations include a recurrent ovarian neoplasm, a postoperative seroma, or lymphocele. Mild anterolateral mural nodularity is questioned along the anterior aspect of this lesion on the prior CT scan. Further evaluation with pre-and postcontrast MR imaging of the pelvis should be considered.       A copy of this report will be faxed to the ordering clinician's office and the office staff will be notified of the incoming fax by telephone. POSITRON EMISSION TOMOGRAPHY/COMPUTED TOMOGRAPHY:    3/29/2017     INDICATION: Ovarian carcinoma.    Subsequent treatment strategy.          RADIOPHARMACEUTICAL: 14.4 mCi F-18 FDG i.v.   Right antecubital injection site.     SERUM GLUCOSE: 91 mg/dL.          COMPARISON PET/CT: None.       CORRELATIVE ANATOMIC IMAGING: CT chest, abdomen, pelvis 3/14/2016.        TECHNIQUE: Initially, F-18 FDG was administered intravenously. After  approximately 01-16 minutes, helically acquired CT was obtained from the skull  base to mid thighs with subsequent PET images, both obtained during normal tidal  respiration. The PET and CT images were reconstructed in the axial, coronal and  sagittal planes and viewed in a co-registered fashion.  CT images are not  performed using standard diagnostic CT protocol, limited by lack of intravenous  iodinated contrast, reduced mAs and kVp (x-rays), and acquisition during normal  tidal respiratory motion (to improve fusion with PET images, however, could make  size assessment of abnormalities less accurate).           -----PET FINDINGS-----        Reference value mediastinal blood pool SUV (max) =  3.0.  -Reference value liver parenchyma SUV (max) =  4.5.     Skull base/Neck: No evidence of malignant glucose activity.          Chest: There is mild FDG uptake within a 1.0 x 1.9 cm left axillary lymph node;  SUV (max) =  2.8. This lymph node has a prominent fatty hilum. The significance  of this finding is unclear. This may represent reactive adenopathy although  metastatic disease cannot be excluded with certainty.     There are no other foci of malignant FDG uptake in the chest.         Abdomen/Pelvis: There is mild FDG uptake within a 1.1 cm diameter left  retroperitoneal lymph node; SUV (max) =  2.3 potentially representing metastatic  disease.     There are no other foci of malignant FDG uptake in the abdomen or pelvis. There  is no evidence of elevated FDG uptake within the 6.1 x 2.2 cm cystic lesion  previously noted in the right hemipelvis.        Osseous structures: Bone scintigraphy is more sensitive for detection of  osteoblastic metastatic disease and PET more sensitive for osteolytic lesions,  suggesting the imaging modalities should be regarded as complementary, and  should not replace each other. Otherwise, no evidence of malignant glucose  activity.          -----ADDITIONAL CT FINDINGS-----     Exam is limited due to lack of intravenous contrast.         CT Skull base/Neck: No significant focal abnormality on this noncontrast study.     No enlarged adenopathy.     CT Chest: Abnormalities are detailed above.  Otherwise no significant interval  change since the recent CT dated 3/14/2016         CT Abdomen/Pelvis: No new finding since the recent CT dated 3/14/2016.     CT Osseous structures: No significant focal abnormality.        Impression:      1. Mild FDG uptake within a small left retroperitoneal lymph node potentially  representing metastatic neoplasm. 2. Mild nonspecific FDG uptake within a left axillary lymph node. 3. No other foci of malignant FDG uptake      Her pathology revealed: 8/12/15  A: LYMPH NODE, RIGHT PELVIC, BIOPSY:  SMALL LYMPHOCYTIC LYMPHOMA (CHRONIC LYMPHOCYTIC LEUKEMIA). NEGATIVE FOR METASTATIC ADENOCARCINOMA. B: RIGHT PELVIC MASS: ADENOCARCINOMA. SPECIMEN: RIGHT OVARY. PROCEDURE: RIGHT OOPHORECTOMY. LYMPH NODE SAMPLING: PERFORMED. SPECIMEN INTEGRITY: RIGHT OVARY CAPSULE RUPTURED. PRIMARY TUMOR SITE: RIGHT OVARY. OVARIAN SURFACE INVOLVEMENT: ABSENT.  TUMOR SIZE: 11.0 CM GREATEST DIMENSION. HISTOLOGIC TYPE: ENDOMETRIOID CARCINOMA. HISTOLOGIC GRADE: MODERATELY DIFFERENTIATED (G2). IMPLANTS: N/A  EXTENT OF INVOLVEMENT OF OTHER TISSUE/ORGANS: NONE IDENTIFIED. LYMPH/VASCULAR INVASION: PRESENT. PATHOLOGIC STAGE: pT1c, N0, M0 (FIGO IC). NUMBER OF LYMPH NODES EXAMINED: 19.  NUMBER OF LYMPH NODES INVOLVED: 0.  C: RIGHT PELVIC LYMPH NODE:  SMALL LYMPHOCYTIC LYMPHOMA. NEGATIVE FOR METASTATIC ADENOCARCINOMA. D: LEFT SALPINGO-OOPHORECTOMY:  ENDOMETRIOSIS AND ADHESIONS. NEGATIVE FOR MALIGNANCY. E: PELVIC MASS, BIOPSY:  BENIGN ADHESIONS AND PERITONEAL TISSUE. NEGATIVE FOR MALIGNANCY. F: TOTAL HYSTERECTOMY:  CERVIX: NO ABNORMALITY SEEN.  ENDOMETRIUM: ATROPHIC. MYOMETRIUM: SUBMUCOSAL AND INTRAMURAL LEIOMYOMAS. SEROSA: ENDOMETRIOSIS WITH ADHESIONS. NEGATIVE FOR MALIGNANCY. G: OMENTUM, RESECTION:  FOAMY MACROPHAGES AND HISTIOCYTES. NEGATIVE FOR MALIGNANCY. H: LYMPH NODES, RIGHT PARA-AORTIC, DISSECTION:  SMALL LYMPHOCYTIC LYMPHOMA. NEGATIVE FOR METASTATIC ADENOCARCINOMA (FOUR NODES). I: LYMPH NODES, LEFT PARA-AORTIC, DISSECTION:  SMALL LYMPHOCYTIC LYMPHOMA. NEGATIVE FOR METASTATIC ADENOCARCINOMA (TWO NODES).   J: LYMPH NODES, LEFT PELVIC, DISSECTION:  SMALL LYMHOCYTIC LYMPHOMA. NEGATIVE FOR METASTATIC ADENOCARCINOMA (FIVE NODES). K: LYMPH NODES, RIGHT PELVIC, DISSECTION:    SMALL LYMPHOCYTIC LYMPHOMA. NEGATIVE FOR METASTATIC ADENOCARCINOMA (SIX NODES). Medications:     Current Outpatient Prescriptions on File Prior to Visit   Medication Sig Dispense Refill    lidocaine-prilocaine (EMLA) topical cream Apply 1 g to affected area as needed for Pain. Place a dime amount over port site 1 hour prior to having port accessed. Cover with saran wrap. 30 g 3    pyridoxine, vitamin B6, (VITAMIN B-6) 100 mg tablet Take 100 mg by mouth daily.  cyanocobalamin 1,000 mcg tablet Take 500 mcg by mouth daily.  acetaminophen (TYLENOL) 325 mg tablet Take 650 mg by mouth every four (4) hours as needed for Pain.  naproxen (NAPROSYN) 500 mg tablet Take 140 mg by mouth two (2) times daily as needed.  levothyroxine (SYNTHROID) 175 mcg tablet Take 175 mcg by mouth Daily (before breakfast). Take dos  Indications: HYPOTHYROIDISM       No current facility-administered medications on file prior to visit. Allergies:      Allergies   Allergen Reactions    Other Plant, Animal, Environmental Swelling     Surgical steel earrings, and surgical clamps caused swelling and / or infection    Penicillins Other (comments)     Psychological reaction       OBJECTIVE:    Vitals:   Visit Vitals    /73 (BP 1 Location: Right arm, BP Patient Position: Sitting)    Pulse 88    Temp 97.8 °F (36.6 °C) (Oral)    Resp 14    Ht 5' 5.5\" (1.664 m)    Wt 102.1 kg (225 lb)    SpO2 94%    BMI 36.87 kg/m2       Physical Examination:  Physical Exam  VITAL SIGNS: Visit Vitals    /73 (BP 1 Location: Right arm, BP Patient Position: Sitting)    Pulse 88    Temp 97.8 °F (36.6 °C) (Oral)    Resp 14    Ht 5' 5.5\" (1.664 m)    Wt 102.1 kg (225 lb)    SpO2 94%    BMI 36.87 kg/m2          GENERAL KRISTINE: in no apparent distress and well developed and well nourished   Breast deferred     MUSCULOSKEL: no joint tenderness, deformity or swelling   INTEGUMENT:  warm and dry, no rashes or lesions   ABDOMEN . soft, NT, ND, No masses appreciated   EXTREMITIES: extremities normal, atraumatic, no cyanosis or edema   PELVIC: NEFG, Spec exam revealed atrophic mucosa, BME revealed no masses, lymphocyst not palpable today   RECTAL: deferred   MANUEL SURVEY: Cervical, supraclavicular, axillary and inguinal nodes normal.   NEURO: Grossly normal         IMPRESSION/PLAN:  Stage IIB G2 endometrioid ovarian cancer who is clinically NGUYEN  s/p adjuvant Carboplatin and Taxol status post 6 cycles  Postoperative course complicated by necrotizing wound infection s/p debridement x2 now healed well. Reviewed CT with no evidence of disease  Reviewed labs, will call with Ca-125 results from today. Reviewed surveillance strategy at this point with exams,  every 6 months  Will arrange IV mediport removal  Chronic lymphocytic lymphoma. F/u with dr. Jacek Cavazos    Total time spent was 40 minutes, >50% of this time was spent counseling regarding clinical findings, chemotherapy associated symptoms and management and coordination of care.        Desi CARIAS-[address]  Gynecologic Oncology  2/16/2018/1:52 PM

## 2018-02-16 NOTE — PROGRESS NOTES
Salinas Juan, a 67 y.o. female,  is here for   Chief Complaint   Patient presents with    Ovarian Cancer     3 month follow up    Other     discuss port removal       Visit Vitals    /73 (BP 1 Location: Right arm, BP Patient Position: Sitting)    Pulse 88    Temp 97.8 °F (36.6 °C) (Oral)    Resp 14    Ht 5' 5.5\" (1.664 m)    Wt 102.1 kg (225 lb)    SpO2 94%    BMI 36.87 kg/m2     Patient reports external itching in her genital region, she is unable to describe where exactly it is located, unsure of when it began. Denies any redness, lumps, or bumps. Patient denies any abdominal or pelvic pain. No unusual bleeding nor discharge. No changes in bladder or bowel habits. 1. Have you been to the ER, urgent care clinic since your last visit? Hospitalized since your last visit? No    2. Have you seen or consulted any other health care providers outside of the [address] [place] [address] since your last visit? Include any pap smears or colon screening.  No

## 2018-02-16 NOTE — MR AVS SNAPSHOT
[place] 
 
 
 [facility] [address] 
[phone] Patient: Vivien Ricks MRN: [id number] :1945 Visit Information Date & Time Provider Department Dept. Phone Encounter #  
 2018 10:30 AM Evangelina Luis MD [facility] [phone] [id number] Your Appointments 3/5/2018 11:00 AM  
SURGERY CONSULT with BSGOS NURSE [facility] Appt Note: surgery counseling  
 One [address] Upcoming Health Maintenance Date Due DTaP/Tdap/Td series (1 - Tdap) 1965 FOBT Q 1 YEAR AGE 50-75 1994 ZOSTER VACCINE AGE 60> 10/27/2004 GLAUCOMA SCREENING Q2Y 2009 OSTEOPOROSIS SCREENING (DEXA) 2009 MEDICARE YEARLY EXAM 2009 Pneumococcal 65+ High/Highest Risk (2 of 2 - PCV13) 2016 Influenza Age 5 to Adult 2017 BREAST CANCER SCRN MAMMOGRAM 10/7/2017 Allergies as of 2018  Review Complete On: 2018 By: Markus Shoulder, LPN Severity Noted Reaction Type Reactions Other Plant, Animal, Environmental  10/01/2015   Side Effect Swelling Surgical steel earrings, and surgical clamps caused swelling and / or infection Penicillins  2015    Other (comments) Psychological reaction Current Immunizations  Reviewed on 2017 Name Date Influenza Vaccine 10/26/2015 Pneumococcal Polysaccharide (PPSV-23) 2015 11:11 AM  
  
 Not reviewed this visit Vitals BP Pulse Temp Resp Height(growth percentile) Weight(growth percentile) 136/73 (BP 1 Location: Right arm, BP Patient Position: Sitting) 88 97.8 °F (36.6 °C) (Oral) 14 5' 5.5\" (1.664 m) 225 lb (102.1 kg) SpO2 BMI OB Status Smoking Status 94% 36.87 kg/m2 Hysterectomy Passive Smoke Exposure - Never Smoker BMI and BSA Data Body Mass Index Body Surface Area  
 36.87 kg/m 2 2.17 m 2 Preferred Pharmacy Pharmacy Name Phone [facility] [address] [facility] [place] [phone] Your Updated Medication List  
  
   
This list is accurate as of: 2/16/18 11:50 AM.  Always use your most recent med list.  
  
  
  
  
 cyanocobalamin 1,000 mcg tablet Take 500 mcg by mouth daily. lidocaine-prilocaine topical cream  
Commonly known as:  EMLA Apply 1 g to affected area as needed for Pain. Place a dime amount over port site 1 hour prior to having port accessed. Cover with saran wrap. NAPROSYN 500 mg tablet Generic drug:  naproxen Take 140 mg by mouth two (2) times daily as needed. pyridoxine (vitamin B6) 100 mg tablet Commonly known as:  VITAMIN B-6 Take 100 mg by mouth daily. SYNTHROID 175 mcg tablet Generic drug:  levothyroxine Take 175 mcg by mouth Daily (before breakfast). Take dos  Indications: HYPOTHYROIDISM TYLENOL 325 mg tablet Generic drug:  acetaminophen Take 650 mg by mouth every four (4) hours as needed for Pain. To-Do List   
 03/06/2018 10:30 AM  
  Appointment with [facility] FAST TRACK NURSE at [facility] ([phone]) Patient Instructions Please call the office as soon as possible if you begin to experience any of the follow:  
Persistent and worsening abdominal or pelvic pain Changes in bladder or bowel habits Unusual vaginal bleeding, discharge, or irritation. Ovarian Cancer: Care Instructions Your Care Instructions Ovarian cancer occurs when abnormal cells grow out of control in or near your ovaries. These cells may spread to other areas in the body. The ovaries are the organs that hold and release a woman's eggs. Treatment usually involves surgery to remove the ovaries. Chemotherapy may also be used. Radiation therapy is rarely used, but in some cases it might be part of treatment. You also may get medicines to help with the side effects of chemotherapy, which may include nausea, vomiting, and fatigue. Finding out that you have cancer is scary. You may feel many emotions and may need some help coping. Seek out family, friends, and counselors for support. You can do things at home to make yourself feel better while you go through treatment. You also can call the [employer] (4-945.972.9519) or visit its website at [address] for more information. Follow-up care is a key part of your treatment and safety. Be sure to make and go to all appointments, and call your doctor if you are having problems. It's also a good idea to know your test results and keep a list of the medicines you take. How can you care for yourself at home? · Take your medicines exactly as prescribed. Call your doctor if you think you are having a problem with your medicine. You may get medicine for nausea and vomiting if you have these side effects. · Eat healthy food. If you do not feel like eating, try to eat food that has protein and extra calories to keep up your strength and prevent weight loss. Drink liquid meal replacements for extra calories and protein. Try to eat your main meal early. Eating smaller portions more often may help as well. · Get some physical activity every day, but do not get too tired. Keep doing the hobbies you enjoy as your energy allows. · Take steps to control your stress and workload. Learn relaxation techniques. ¨ Share your feelings. Stress and tension affect our emotions. By expressing your feelings to others, you may be able to understand and cope with them. ¨ Consider joining a support group.  Talking about a problem with your spouse, a good friend, or other people with similar problems is a good way to reduce tension and stress. ¨ Express yourself through art. Try writing, dance, art, or crafts to relieve tension. Some dance, writing, or art groups may be available just for people who have cancer. ¨ Be kind to your body and mind. Getting enough sleep, eating a healthy diet, and taking time to do things you enjoy can contribute to an overall feeling of balance in your life and help reduce stress. ¨ Get help if you need it. Discuss your concerns with your doctor or counselor. · If you are vomiting or have diarrhea: ¨ Drink plenty of fluids (enough so that your urine is light yellow or clear like water) to prevent dehydration. Choose water and other caffeine-free clear liquids. If you have kidney, heart, or liver disease and have to limit fluids, talk with your doctor before you increase the amount of fluids you drink. ¨ When you are able to eat, try clear soups, mild foods, and liquids until all symptoms are gone for 12 to 48 hours. Other good choices include dry toast, crackers, cooked cereal, and gelatin dessert, such as Jell-O. 
· Take care of your urinary tract to prevent problems such as infection, which can be caused by ovarian cancer and its treatment. Limit drinks with caffeine, drink plenty of fluids, and urinate every 3 or 4 hours. · If you have not already done so, prepare a list of advance directives. Advance directives are instructions to your doctor and family members about what kind of care you want if you become unable to speak or express yourself. When should you call for help? Call 911 anytime you think you may need emergency care. For example, call if: 
? · You passed out (lost consciousness). ?Call your doctor now or seek immediate medical care if: 
? · You have a fever. ? · You have abnormal bleeding. ? · You have new or worse pain. ? · You think you have an infection. ? · You have new symptoms, such as a cough, belly pain, vomiting, diarrhea, or a rash. ?Watch closely for changes in your health, and be sure to contact your doctor if: 
? · You are much more tired than usual.  
? · You have swollen glands in your armpits, groin, or neck. ? · You do not get better as expected. Where can you learn more? Go to http://modesto-david.info/. Enter D145 in the search box to learn more about \"Ovarian Cancer: Care Instructions. \" Current as of: May 12, 2017 Content Version: 11.4 © 6603-9106 [employer]. Care instructions adapted under license by [employer] (which disclaims liability or warranty for this information). If you have questions about a medical condition or this instruction, always ask your healthcare professional. [address] any warranty or liability for your use of this information. Introducing \[facility] [place] [facility]\"" & HEALTH SERVICES! Amira Feliciano introduces [employer] patient portal. Now you can access parts of your medical record, email your doctor's office, and request medication refills online. 1. In your internet browser, go to [email or web address] 2. Click on the First Time User? Click Here link in the Sign In box. You will see the New Member Sign Up page. 3. Enter your Live[employer] Access Code exactly as it appears below. You will not need to use this code after youve completed the sign-up process. If you do not sign up before the expiration date, you must request a new code. · [employer] Access Code: [email or web address] Expires: 5/17/2018 10:44 AM 
 
4. Enter the last four digits of your Social Security Number (xxxx) and Date of Birth (mm/dd/yyyy) as indicated and click Submit. You will be taken to the next sign-up page. 5. Create a [employer] ID. This will be your [employer] login ID and cannot be changed, so think of one that is secure and easy to remember. 6. Create a [employer] password. You can change your password at any time. 7. Enter your Password Reset Question and Answer. This can be used at a later time if you forget your password. 8. Enter your e-mail address. You will receive e-mail notification when new information is available in [address] 9. Click Sign Up. You can now view and download portions of your medical record. 10. Click the Download Summary menu link to download a portable copy of your medical information. If you have questions, please visit the Frequently Asked Questions section of the [employer] website. Remember, [employer] is NOT to be used for urgent needs. For medical emergencies, dial 911. Now available from your iPhone and Android! Please provide this summary of care documentation to your next provider. Your primary care clinician is listed as Emilee Matson. If you have any questions after today's visit, please call [phone].

## 2018-02-16 NOTE — PATIENT INSTRUCTIONS
Please call the office as soon as possible if you begin to experience any of the follow:   Persistent and worsening abdominal or pelvic pain  Changes in bladder or bowel habits  Unusual vaginal bleeding, discharge, or irritation. Ovarian Cancer: Care Instructions  Your Care Instructions  Ovarian cancer occurs when abnormal cells grow out of control in or near your ovaries. These cells may spread to other areas in the body. The ovaries are the organs that hold and release a woman's eggs. Treatment usually involves surgery to remove the ovaries. Chemotherapy may also be used. Radiation therapy is rarely used, but in some cases it might be part of treatment. You also may get medicines to help with the side effects of chemotherapy, which may include nausea, vomiting, and fatigue. Finding out that you have cancer is scary. You may feel many emotions and may need some help coping. Seek out family, friends, and counselors for support. You can do things at home to make yourself feel better while you go through treatment. You also can call the [employer] (8-315.407.1374) or visit its website at [address] for more information. Follow-up care is a key part of your treatment and safety. Be sure to make and go to all appointments, and call your doctor if you are having problems. It's also a good idea to know your test results and keep a list of the medicines you take. How can you care for yourself at home? · Take your medicines exactly as prescribed. Call your doctor if you think you are having a problem with your medicine. You may get medicine for nausea and vomiting if you have these side effects. · Eat healthy food. If you do not feel like eating, try to eat food that has protein and extra calories to keep up your strength and prevent weight loss. Drink liquid meal replacements for extra calories and protein. Try to eat your main meal early.  Eating smaller portions more often may help as well.  · Get some physical activity every day, but do not get too tired. Keep doing the hobbies you enjoy as your energy allows. · Take steps to control your stress and workload. Learn relaxation techniques. ¨ Share your feelings. Stress and tension affect our emotions. By expressing your feelings to others, you may be able to understand and cope with them. ¨ Consider joining a support group. Talking about a problem with your spouse, a good friend, or other people with similar problems is a good way to reduce tension and stress. ¨ Express yourself through art. Try writing, dance, art, or crafts to relieve tension. Some dance, writing, or art groups may be available just for people who have cancer. ¨ Be kind to your body and mind. Getting enough sleep, eating a healthy diet, and taking time to do things you enjoy can contribute to an overall feeling of balance in your life and help reduce stress. ¨ Get help if you need it. Discuss your concerns with your doctor or counselor. · If you are vomiting or have diarrhea:  ¨ Drink plenty of fluids (enough so that your urine is light yellow or clear like water) to prevent dehydration. Choose water and other caffeine-free clear liquids. If you have kidney, heart, or liver disease and have to limit fluids, talk with your doctor before you increase the amount of fluids you drink. ¨ When you are able to eat, try clear soups, mild foods, and liquids until all symptoms are gone for 12 to 48 hours. Other good choices include dry toast, crackers, cooked cereal, and gelatin dessert, such as Jell-O.  · Take care of your urinary tract to prevent problems such as infection, which can be caused by ovarian cancer and its treatment. Limit drinks with caffeine, drink plenty of fluids, and urinate every 3 or 4 hours. · If you have not already done so, prepare a list of advance directives.  Advance directives are instructions to your doctor and family members about what kind of care you want if you become unable to speak or express yourself. When should you call for help? Call 911 anytime you think you may need emergency care. For example, call if:  ? · You passed out (lost consciousness). ?Call your doctor now or seek immediate medical care if:  ? · You have a fever. ? · You have abnormal bleeding. ? · You have new or worse pain. ? · You think you have an infection. ? · You have new symptoms, such as a cough, belly pain, vomiting, diarrhea, or a rash. ? Watch closely for changes in your health, and be sure to contact your doctor if:  ? · You are much more tired than usual.   ? · You have swollen glands in your armpits, groin, or neck. ? · You do not get better as expected. Where can you learn more? Go to http://modesto-david.info/. Enter D145 in the search box to learn more about \"Ovarian Cancer: Care Instructions. \"  Current as of: May 12, 2017  Content Version: 11.4  © 9154-8096 [employer]. Care instructions adapted under license by [employer] (which disclaims liability or warranty for this information). If you have questions about a medical condition or this instruction, always ask your healthcare professional. [address] any warranty or liability for your use of this information.

## 2018-02-20 ENCOUNTER — APPOINTMENT (OUTPATIENT)
Dept: INFUSION THERAPY | Age: 73
End: 2018-02-20
Payer: MEDICARE

## 2018-02-21 DIAGNOSIS — Z01.818 PREOPERATIVE TESTING: Primary | ICD-10-CM

## 2018-03-05 ENCOUNTER — TELEPHONE (OUTPATIENT)
Dept: ONCOLOGY | Age: 73
End: 2018-03-05

## 2018-03-05 NOTE — TELEPHONE ENCOUNTER
Patient called and I spoke with her. She is cancelling her port removal surgery. I will let [facility] know continue to reschedule her for port flush and labs as ordered and have them call patient back to schedule the appointment. Patient states \"I was reading on the internet and don't think it is a good idea to have it removed right now as we are getting ready to move and downsizing everything, and it is not bothering me at all\".

## 2018-03-06 ENCOUNTER — APPOINTMENT (OUTPATIENT)
Dept: INFUSION THERAPY | Age: 73
End: 2018-03-06
Payer: MEDICARE

## 2018-03-07 ENCOUNTER — HOSPITAL ENCOUNTER (OUTPATIENT)
Dept: INFUSION THERAPY | Age: 73
Discharge: HOME | End: 2018-03-07
Payer: MEDICARE

## 2018-03-07 VITALS
DIASTOLIC BLOOD PRESSURE: 55 MMHG | OXYGEN SATURATION: 98 % | SYSTOLIC BLOOD PRESSURE: 145 MMHG | RESPIRATION RATE: 18 BRPM | HEART RATE: 68 BPM | TEMPERATURE: 98 F

## 2018-03-07 PROCEDURE — 77030012965 HC NDL HUBR BBMI -A

## 2018-03-07 PROCEDURE — 96523 IRRIG DRUG DELIVERY DEVICE: CPT

## 2018-03-07 PROCEDURE — 74011250636 HC RX REV CODE- 250/636: Performed by: OBSTETRICS & GYNECOLOGY

## 2018-03-07 RX ORDER — HEPARIN 100 UNIT/ML
500 SYRINGE INTRAVENOUS ONCE
Status: COMPLETED | OUTPATIENT
Start: 2018-03-07 | End: 2018-03-07

## 2018-03-07 RX ORDER — SODIUM CHLORIDE 0.9 % (FLUSH) 0.9 %
5-10 SYRINGE (ML) INJECTION AS NEEDED
Status: DISCONTINUED | OUTPATIENT
Start: 2018-03-07 | End: 2018-03-11 | Stop reason: HOSPADM

## 2018-03-07 RX ADMIN — Medication 10 ML: at 11:08

## 2018-03-07 RX ADMIN — Medication 500 UNITS: at 11:08

## 2018-03-07 NOTE — PROGRESS NOTES
[facility] Short Consult Note                  (Labs/Type & Cross/Portflush/Antibiotic/Non-Chemo injections)      Date: 2018    Name: Nathan Haddad    MRN: [id number]         : 1945    Treatment: Earl Sprout      Ms. Luz Tsai was assessed and education was provided. Ms. Luz Tsai arrived ambulatory using cane with  at her side. Ms. Luz Tsai stated she is feeling \"good\". Ms. Luz Tsai c/o fullness and some hearing loss in left ear. Ms. Luz Tsai stated she has an appointment scheduled to see her PCP and would discuss with PCP. Ms. Darvin Shanks vitals were reviewed and patient was observed for 5 minutes prior to treatment. Visit Vitals    /55 (BP 1 Location: Right arm, BP Patient Position: Sitting)    Pulse 68    Temp 98 °F (36.7 °C)    Resp 18    SpO2 98%    Breastfeeding No     Ms. Luz Tsai' port was accessed and flushed per orders, per protocol without complications and band aid applied to site. Ms. Luz Tsai tolerated the procedure, and had no complaints. Ms. Luz Tsai was discharged from [place] [address] in stable condition at 1110. She is to return on 4/10/18 at 56 for her next appointment. Armband removed and shredded.      Vianney Tucker RN  2018  11:20 AM

## 2018-04-10 ENCOUNTER — APPOINTMENT (OUTPATIENT)
Dept: INFUSION THERAPY | Age: 73
End: 2018-04-10
Payer: MEDICARE

## 2018-04-17 ENCOUNTER — HOSPITAL ENCOUNTER (OUTPATIENT)
Dept: INFUSION THERAPY | Age: 73
Discharge: HOME | End: 2018-04-17
Payer: MEDICARE

## 2018-04-17 VITALS
DIASTOLIC BLOOD PRESSURE: 75 MMHG | SYSTOLIC BLOOD PRESSURE: 145 MMHG | HEART RATE: 61 BPM | TEMPERATURE: 97.6 F | RESPIRATION RATE: 18 BRPM

## 2018-04-17 LAB
ALBUMIN SERPL-MCNC: 4.1 G/DL (ref 3.4–5)
ALBUMIN/GLOB SERPL: 1.4 {RATIO} (ref 0.8–1.7)
ALP SERPL-CCNC: 88 U/L (ref 45–117)
ALT SERPL-CCNC: 26 U/L (ref 13–56)
ANION GAP SERPL CALC-SCNC: 9 MMOL/L (ref 3–18)
AST SERPL-CCNC: 13 U/L (ref 15–37)
BASO+EOS+MONOS # BLD AUTO: 1 K/UL (ref 0–2.3)
BASO+EOS+MONOS # BLD AUTO: 8 % (ref 0.1–17)
BILIRUB SERPL-MCNC: 0.4 MG/DL (ref 0.2–1)
BUN SERPL-MCNC: 22 MG/DL (ref 7–18)
BUN/CREAT SERPL: 25 (ref 12–20)
CALCIUM SERPL-MCNC: 9.7 MG/DL (ref 8.5–10.1)
CHLORIDE SERPL-SCNC: 102 MMOL/L (ref 100–108)
CO2 SERPL-SCNC: 27 MMOL/L (ref 21–32)
CREAT SERPL-MCNC: 0.87 MG/DL (ref 0.6–1.3)
DIFFERENTIAL METHOD BLD: ABNORMAL
ERYTHROCYTE [DISTWIDTH] IN BLOOD BY AUTOMATED COUNT: 12.6 % (ref 11.5–14.5)
GLOBULIN SER CALC-MCNC: 3 G/DL (ref 2–4)
GLUCOSE SERPL-MCNC: 137 MG/DL (ref 74–99)
HCT VFR BLD AUTO: 38 % (ref 36–48)
HGB BLD-MCNC: 12.8 G/DL (ref 12–16)
LYMPHOCYTES # BLD: 1.7 K/UL (ref 1.1–5.9)
LYMPHOCYTES NFR BLD: 13 % (ref 14–44)
MAGNESIUM SERPL-MCNC: 1.7 MG/DL (ref 1.6–2.6)
MCH RBC QN AUTO: 29.8 PG (ref 25–35)
MCHC RBC AUTO-ENTMCNC: 33.7 G/DL (ref 31–37)
MCV RBC AUTO: 88.4 FL (ref 78–102)
NEUTS SEG # BLD: 10.3 K/UL (ref 1.8–9.5)
NEUTS SEG NFR BLD: 79 % (ref 40–70)
PLATELET # BLD AUTO: 398 K/UL (ref 135–420)
POTASSIUM SERPL-SCNC: 3.7 MMOL/L (ref 3.5–5.5)
PROT SERPL-MCNC: 7.1 G/DL (ref 6.4–8.2)
RBC # BLD AUTO: 4.3 M/UL (ref 4.1–5.1)
SODIUM SERPL-SCNC: 138 MMOL/L (ref 136–145)
WBC # BLD AUTO: 13 K/UL (ref 4.5–13)

## 2018-04-17 PROCEDURE — 80053 COMPREHEN METABOLIC PANEL: CPT | Performed by: OBSTETRICS & GYNECOLOGY

## 2018-04-17 PROCEDURE — 85049 AUTOMATED PLATELET COUNT: CPT | Performed by: OBSTETRICS & GYNECOLOGY

## 2018-04-17 PROCEDURE — 83735 ASSAY OF MAGNESIUM: CPT | Performed by: OBSTETRICS & GYNECOLOGY

## 2018-04-17 PROCEDURE — 74011250636 HC RX REV CODE- 250/636: Performed by: OBSTETRICS & GYNECOLOGY

## 2018-04-17 PROCEDURE — 36415 COLL VENOUS BLD VENIPUNCTURE: CPT | Performed by: OBSTETRICS & GYNECOLOGY

## 2018-04-17 PROCEDURE — 77030012965 HC NDL HUBR BBMI -A

## 2018-04-17 PROCEDURE — 36591 DRAW BLOOD OFF VENOUS DEVICE: CPT

## 2018-04-17 PROCEDURE — 86304 IMMUNOASSAY TUMOR CA 125: CPT | Performed by: OBSTETRICS & GYNECOLOGY

## 2018-04-17 PROCEDURE — 85025 COMPLETE CBC W/AUTO DIFF WBC: CPT | Performed by: OBSTETRICS & GYNECOLOGY

## 2018-04-17 RX ORDER — HEPARIN 100 UNIT/ML
500 SYRINGE INTRAVENOUS ONCE
Status: COMPLETED | OUTPATIENT
Start: 2018-04-17 | End: 2018-04-17

## 2018-04-17 RX ORDER — SODIUM CHLORIDE 0.9 % (FLUSH) 0.9 %
10-40 SYRINGE (ML) INJECTION AS NEEDED
Status: DISCONTINUED | OUTPATIENT
Start: 2018-04-17 | End: 2018-04-21 | Stop reason: HOSPADM

## 2018-04-17 RX ADMIN — Medication 10 ML: at 11:00

## 2018-04-17 RX ADMIN — HEPARIN 500 UNITS: 100 SYRINGE at 11:01

## 2018-04-17 NOTE — PROGRESS NOTES
[facility] Progress Note    Date: 2018    Name: Billy Vela    MRN: [id number]         : 1945      Ms. Nanci Norwood arrived to [facility] at [id number] for labs and port care    Pt is here today for port flush and labs. Ms. Nanci Norwood was assessed and education was provided. Ms. Katie Quinones vitals were reviewed. Visit Vitals    /75 (BP 1 Location: Right arm, BP Patient Position: Sitting)    Pulse 61    Temp 97.6 °F (36.4 °C)    Resp 18    Breastfeeding No       Patient's left upper chest port accessed and blood drawn for labs. Lab results were obtained using a 20 g Sebastian needle on 1st attempt, brisk blood flow verified. Recent Results (from the past 12 hour(s))   CBC WITH 3 PART DIFF    Collection Time: 18 10:45 AM   Result Value Ref Range    WBC 13.0 4.5 - 13.0 K/uL    RBC 4.30 4. 10 - 5.10 M/uL    HGB 12.8 12.0 - 16.0 g/dL    HCT 38.0 36 - 48 %    MCV 88.4 78 - 102 FL    MCH 29.8 25.0 - 35.0 PG    MCHC 33.7 31 - 37 g/dL    RDW 12.6 11.5 - 14.5 %    NEUTROPHILS 79 (H) 40 - 70 %    MIXED CELLS 8 0.1 - 17 %    LYMPHOCYTES 13 (L) 14 - 44 %    ABS. NEUTROPHILS 10.3 (H) 1.8 - 9.5 K/UL    ABS. MIXED CELLS 1.0 0.0 - 2.3 K/uL    ABS. LYMPHOCYTES 1.7 1.1 - 5.9 K/UL    DF AUTOMATED     MAGNESIUM    Collection Time: 18 10:45 AM   Result Value Ref Range    Magnesium 1.7 1.6 - 2.6 mg/dL   METABOLIC PANEL, COMPREHENSIVE    Collection Time: 18 10:45 AM   Result Value Ref Range    Sodium 138 136 - 145 mmol/L    Potassium 3.7 3.5 - 5.5 mmol/L    Chloride 102 100 - 108 mmol/L    CO2 27 21 - 32 mmol/L    Anion gap 9 3.0 - 18 mmol/L    Glucose 137 (H) 74 - 99 mg/dL    BUN 22 (H) 7.0 - 18 MG/DL    Creatinine 0.87 0.6 - 1.3 MG/DL    BUN/Creatinine ratio 25 (H) 12 - 20      GFR est AA >60 >60 ml/min/1.73m2    GFR est non-AA >60 >60 ml/min/1.73m2    Calcium 9.7 8.5 - 10.1 MG/DL    Bilirubin, total 0.4 0.2 - 1.0 MG/DL    ALT (SGPT) 26 13 - 56 U/L    AST (SGOT) 13 (L) 15 - 37 U/L    Alk.  phosphatase 88 45 - 117 U/L    Protein, total 7.1 6.4 - 8.2 g/dL    Albumin 4.1 3.4 - 5.0 g/dL    Globulin 3.0 2.0 - 4.0 g/dL    A-G Ratio 1.4 0.8 - 1.7       See [facility] for platelets and  results      Ms. Terrazas tolerated  without complaints. Port flushed with 10 cc normal saline  With 500 units  heparin per order and de-accessed. Band-aid applied to site. Ms. Jose Juan Longoria was discharged from [place] [address] in stable condition at 1145. She is to return on May 22, 2018 at 1030 for her next port flush appointment.     Lanie Ross RN  April 17, 2018

## 2018-04-18 LAB — CANCER AG125 SERPL-ACNC: 20 U/ML (ref 0–38.1)

## 2018-05-22 ENCOUNTER — HOSPITAL ENCOUNTER (OUTPATIENT)
Dept: INFUSION THERAPY | Age: 73
Discharge: HOME | End: 2018-05-22
Payer: MEDICARE

## 2018-05-22 VITALS
OXYGEN SATURATION: 96 % | HEART RATE: 73 BPM | SYSTOLIC BLOOD PRESSURE: 134 MMHG | TEMPERATURE: 97 F | DIASTOLIC BLOOD PRESSURE: 72 MMHG | RESPIRATION RATE: 18 BRPM

## 2018-05-22 PROCEDURE — 96523 IRRIG DRUG DELIVERY DEVICE: CPT

## 2018-05-22 PROCEDURE — 77030012965 HC NDL HUBR BBMI -A

## 2018-05-22 PROCEDURE — 74011250636 HC RX REV CODE- 250/636: Performed by: OBSTETRICS & GYNECOLOGY

## 2018-05-22 RX ORDER — HEPARIN 100 UNIT/ML
500 SYRINGE INTRAVENOUS ONCE
Status: COMPLETED | OUTPATIENT
Start: 2018-05-22 | End: 2018-05-22

## 2018-05-22 RX ORDER — SODIUM CHLORIDE 0.9 % (FLUSH) 0.9 %
10-40 SYRINGE (ML) INJECTION AS NEEDED
Status: DISCONTINUED | OUTPATIENT
Start: 2018-05-22 | End: 2018-05-26 | Stop reason: HOSPADM

## 2018-05-22 RX ADMIN — Medication 10 ML: at 10:40

## 2018-05-22 RX ADMIN — HEPARIN 500 UNITS: 100 SYRINGE at 10:40

## 2018-05-22 NOTE — PROGRESS NOTES
[facility] Progress Note    Date: May 22, 2018    Name: Ty Fabry    MRN: [id number]         : 1945    Port Flush    Ms. Matthias De La Torre to [facility], [facility], at 56 accompanied by her . Pt was assessed and education was provided. Ms. Shine Johnson vitals were reviewed. Visit Vitals    /72 (BP 1 Location: Right arm)    Pulse 73    Temp 97 °F (36.1 °C)    Resp 18    SpO2 96%    Breastfeeding No       Upper left chest mediport was accessed with 20 gauge 0.75 inch العراقي after chloroprep. Port flushed easily and had brisk blood return. Mediport flushed with NS 20 ml and Heparin 500 units then de-accessed. No irritation or bleeding noted. Band-Aid applied. Ms. Matthias De La Torre tolerated the procedure, and had no complaints. Patient armband removed and shredded. Ms. Matthias De La Torre was discharged from [place] [address] in stable condition at 1045. She is to return on 18 at 56 for her next appointment.     Irving Olmedo RN  May 22, 2018

## 2018-06-26 ENCOUNTER — HOSPITAL ENCOUNTER (OUTPATIENT)
Dept: INFUSION THERAPY | Age: 73
Discharge: HOME | End: 2018-06-26
Payer: MEDICARE

## 2018-06-26 VITALS
TEMPERATURE: 97.3 F | DIASTOLIC BLOOD PRESSURE: 72 MMHG | WEIGHT: 226.3 LBS | BODY MASS INDEX: 37.09 KG/M2 | HEART RATE: 64 BPM | OXYGEN SATURATION: 98 % | SYSTOLIC BLOOD PRESSURE: 132 MMHG | RESPIRATION RATE: 18 BRPM

## 2018-06-26 LAB
ALBUMIN SERPL-MCNC: 3.9 G/DL (ref 3.4–5)
ALBUMIN/GLOB SERPL: 1.3 {RATIO} (ref 0.8–1.7)
ALP SERPL-CCNC: 94 U/L (ref 45–117)
ALT SERPL-CCNC: 29 U/L (ref 13–56)
ANION GAP SERPL CALC-SCNC: 11 MMOL/L (ref 3–18)
AST SERPL-CCNC: 23 U/L (ref 15–37)
BASO+EOS+MONOS # BLD AUTO: 0.8 K/UL (ref 0–2.3)
BASO+EOS+MONOS # BLD AUTO: 10 % (ref 0.1–17)
BILIRUB SERPL-MCNC: 0.4 MG/DL (ref 0.2–1)
BUN SERPL-MCNC: 17 MG/DL (ref 7–18)
BUN/CREAT SERPL: 18 (ref 12–20)
CALCIUM SERPL-MCNC: 9.1 MG/DL (ref 8.5–10.1)
CHLORIDE SERPL-SCNC: 104 MMOL/L (ref 100–108)
CO2 SERPL-SCNC: 26 MMOL/L (ref 21–32)
CREAT SERPL-MCNC: 0.92 MG/DL (ref 0.6–1.3)
DIFFERENTIAL METHOD BLD: ABNORMAL
ERYTHROCYTE [DISTWIDTH] IN BLOOD BY AUTOMATED COUNT: 12.6 % (ref 11.5–14.5)
GLOBULIN SER CALC-MCNC: 2.9 G/DL (ref 2–4)
GLUCOSE SERPL-MCNC: 92 MG/DL (ref 74–99)
HCT VFR BLD AUTO: 36.8 % (ref 36–48)
HGB BLD-MCNC: 12.3 G/DL (ref 12–16)
LYMPHOCYTES # BLD: 1.2 K/UL (ref 1.1–5.9)
LYMPHOCYTES NFR BLD: 16 % (ref 14–44)
MAGNESIUM SERPL-MCNC: 1.5 MG/DL (ref 1.6–2.6)
MCH RBC QN AUTO: 30.1 PG (ref 25–35)
MCHC RBC AUTO-ENTMCNC: 33.4 G/DL (ref 31–37)
MCV RBC AUTO: 90 FL (ref 78–102)
NEUTS SEG # BLD: 5.8 K/UL (ref 1.8–9.5)
NEUTS SEG NFR BLD: 74 % (ref 40–70)
PLATELET # BLD AUTO: 306 K/UL (ref 140–440)
POTASSIUM SERPL-SCNC: 4.4 MMOL/L (ref 3.5–5.5)
PROT SERPL-MCNC: 6.8 G/DL (ref 6.4–8.2)
RBC # BLD AUTO: 4.09 M/UL (ref 4.1–5.1)
SODIUM SERPL-SCNC: 141 MMOL/L (ref 136–145)
WBC # BLD AUTO: 7.8 K/UL (ref 4.5–13)

## 2018-06-26 PROCEDURE — 80053 COMPREHEN METABOLIC PANEL: CPT | Performed by: OBSTETRICS & GYNECOLOGY

## 2018-06-26 PROCEDURE — 86304 IMMUNOASSAY TUMOR CA 125: CPT | Performed by: OBSTETRICS & GYNECOLOGY

## 2018-06-26 PROCEDURE — 77030012965 HC NDL HUBR BBMI -A

## 2018-06-26 PROCEDURE — 36415 COLL VENOUS BLD VENIPUNCTURE: CPT | Performed by: OBSTETRICS & GYNECOLOGY

## 2018-06-26 PROCEDURE — 85025 COMPLETE CBC W/AUTO DIFF WBC: CPT | Performed by: OBSTETRICS & GYNECOLOGY

## 2018-06-26 PROCEDURE — 36591 DRAW BLOOD OFF VENOUS DEVICE: CPT

## 2018-06-26 PROCEDURE — 74011250636 HC RX REV CODE- 250/636: Performed by: OBSTETRICS & GYNECOLOGY

## 2018-06-26 PROCEDURE — 83735 ASSAY OF MAGNESIUM: CPT | Performed by: OBSTETRICS & GYNECOLOGY

## 2018-06-26 RX ORDER — HEPARIN 100 UNIT/ML
500 SYRINGE INTRAVENOUS ONCE
Status: COMPLETED | OUTPATIENT
Start: 2018-06-26 | End: 2018-06-26

## 2018-06-26 RX ORDER — SODIUM CHLORIDE 0.9 % (FLUSH) 0.9 %
10-40 SYRINGE (ML) INJECTION AS NEEDED
Status: DISCONTINUED | OUTPATIENT
Start: 2018-06-26 | End: 2018-06-30 | Stop reason: HOSPADM

## 2018-06-26 RX ADMIN — Medication 500 UNITS: at 11:08

## 2018-06-26 RX ADMIN — Medication 40 ML: at 11:08

## 2018-06-26 RX ADMIN — Medication 40 ML: at 11:05

## 2018-06-26 NOTE — PROGRESS NOTES
[facility] Progress Note    Date: 2018    Name: Chaz Boucher    MRN: [id number]         : 1945    Monthly Port flush & Lab Draw    Ms. Ruth Carroll to [facility], ambulatory with cane, at 1030 accompanied by her . Pt was assessed and education was provided. Patient denies complaints today. Ms. Leighton Cruz vitals were reviewed. Visit Vitals    /72 (BP 1 Location: Right arm, BP Patient Position: Sitting)    Pulse 64    Temp 97.3 °F (36.3 °C)    Resp 18    Wt 102.6 kg (226 lb 4.8 oz)    SpO2 98%    Breastfeeding No    BMI 37.09 kg/m2       Left chest mediport was accessed with 20 gauge 3/4\" العراقي(s) after chloroprep. Port flushed easily and had brisk blood return initially. However, port had to be vigorously flushed and patient repositioned to lying back with arms elevated to get enough blood for labs. Blood drawn off and sent to lab for CMP, Mg, and CA-125 after 10 ml waste per written orders. CBC run on [employer]. Recent Results (from the past 12 hour(s))   CBC WITH 3 PART DIFF    Collection Time: 18 11:08 AM   Result Value Ref Range    WBC 7.8 4.5 - 13.0 K/uL    RBC 4.09 (L) 4.10 - 5.10 M/uL    HGB 12.3 12.0 - 16.0 g/dL    HCT 36.8 36 - 48 %    MCV 90.0 78 - 102 FL    MCH 30.1 25.0 - 35.0 PG    MCHC 33.4 31 - 37 g/dL    RDW 12.6 11.5 - 14.5 %    PLATELET 078 936 - 427 K/uL    NEUTROPHILS 74 (H) 40 - 70 %    MIXED CELLS 10 0.1 - 17 %    LYMPHOCYTES 16 14 - 44 %    ABS. NEUTROPHILS 5.8 1.8 - 9.5 K/UL    ABS. MIXED CELLS 0.8 0.0 - 2.3 K/uL    ABS.  LYMPHOCYTES 1.2 1.1 - 5.9 K/UL    DF AUTOMATED     MAGNESIUM    Collection Time: 18 11:08 AM   Result Value Ref Range    Magnesium 1.5 (L) 1.6 - 2.6 mg/dL   METABOLIC PANEL, COMPREHENSIVE    Collection Time: 18 11:08 AM   Result Value Ref Range    Sodium 141 136 - 145 mmol/L    Potassium 4.4 3.5 - 5.5 mmol/L    Chloride 104 100 - 108 mmol/L    CO2 26 21 - 32 mmol/L    Anion gap 11 3.0 - 18 mmol/L    Glucose 92 74 - 99 mg/dL BUN 17 7.0 - 18 MG/DL    Creatinine 0.92 0.6 - 1.3 MG/DL    BUN/Creatinine ratio 18 12 - 20      GFR est AA >60 >60 ml/min/1.73m2    GFR est non-AA 60 (L) >60 ml/min/1.73m2    Calcium 9.1 8.5 - 10.1 MG/DL    Bilirubin, total 0.4 0.2 - 1.0 MG/DL    ALT (SGPT) 29 13 - 56 U/L    AST (SGOT) 23 15 - 37 U/L    Alk. phosphatase 94 45 - 117 U/L    Protein, total 6.8 6.4 - 8.2 g/dL    Albumin 3.9 3.4 - 5.0 g/dL    Globulin 2.9 2.0 - 4.0 g/dL    A-G Ratio 1.3 0.8 - 1.7         Mediport flushed with NS 20 ml and Heparin 500 units then de-accessed. No irritation or bleeding noted. Band-Aid applied. Ms. Verna Nageotte tolerated the procedure, and had no complaints. Patient armband removed and shredded. Ms. Verna Nageotte was discharged from [place] [address] in stable condition at 1110. She is to return on 8/7/18 at 1100 for her next port flush appointment.     Dorota Burgess RN  June 26, 2018

## 2018-06-27 ENCOUNTER — TELEPHONE (OUTPATIENT)
Dept: ONCOLOGY | Age: 73
End: 2018-06-27

## 2018-06-27 LAB — CANCER AG125 SERPL-ACNC: 18.1 U/ML (ref 0–38.1)

## 2018-06-27 NOTE — TELEPHONE ENCOUNTER
Reviewed labs, all WNL, mag slightly low at 1.5. Patient needs to schedule surveillance visit for mid-August. She is leaving for a few trips in the next week and will need to call back to do so. Advised that I will also send a message to [facility] to reach out to schedule. Patient agreeable. Advised to call office sooner PRN.

## 2018-07-31 ENCOUNTER — APPOINTMENT (OUTPATIENT)
Dept: INFUSION THERAPY | Age: 73
End: 2018-07-31
Payer: MEDICARE

## 2018-08-07 ENCOUNTER — HOSPITAL ENCOUNTER (OUTPATIENT)
Dept: INFUSION THERAPY | Age: 73
Discharge: HOME | End: 2018-08-07
Payer: MEDICARE

## 2018-08-07 VITALS
TEMPERATURE: 98.2 F | HEART RATE: 64 BPM | DIASTOLIC BLOOD PRESSURE: 60 MMHG | RESPIRATION RATE: 18 BRPM | OXYGEN SATURATION: 96 % | SYSTOLIC BLOOD PRESSURE: 139 MMHG

## 2018-08-07 PROCEDURE — 96523 IRRIG DRUG DELIVERY DEVICE: CPT

## 2018-08-07 PROCEDURE — 77030012965 HC NDL HUBR BBMI -A

## 2018-08-07 RX ORDER — HEPARIN 100 UNIT/ML
500 SYRINGE INTRAVENOUS ONCE
Status: ACTIVE | OUTPATIENT
Start: 2018-08-07 | End: 2018-08-07

## 2018-08-07 RX ORDER — SODIUM CHLORIDE 0.9 % (FLUSH) 0.9 %
10-40 SYRINGE (ML) INJECTION AS NEEDED
Status: DISCONTINUED | OUTPATIENT
Start: 2018-08-07 | End: 2018-08-11 | Stop reason: HOSPADM

## 2018-08-07 NOTE — PROGRESS NOTES
[facility] Progress Note    Date: 2018    Name: Harpreet Glass    MRN: [id number]         : 1945    Port flush    Ms. Callie Ramirez to [facility], ambulatory, at 1120 accompanied by her . Pt was assessed and education was provided. Ms. Stacy Cordero vitals were reviewed. Visit Vitals    /60 (BP 1 Location: Right arm, BP Patient Position: Sitting)    Pulse 64    Temp 98.2 °F (36.8 °C)    Resp 18    SpO2 96%       Upper left chest mediport was accessed with 20 gauge 0.75 inch العراقي after chloroprep. Port flushed easily and had brisk blood return. Mediport flushed with NS 20 ml and Heparin 500 units then de-accessed. No irritation or bleeding noted. Band-Aid applied. Ms. Callie Ramirez tolerated the procedure, and had no complaints. Patient armband removed and shredded. Ms. Callie Ramirez was discharged from [place] [address] in stable condition at 1120. She is to return on 18 at 56   for her next appointment.     Claudia Schuler RN  2018

## 2018-09-04 ENCOUNTER — APPOINTMENT (OUTPATIENT)
Dept: INFUSION THERAPY | Age: 73
End: 2018-09-04
Payer: MEDICARE

## 2018-09-18 ENCOUNTER — HOSPITAL ENCOUNTER (OUTPATIENT)
Dept: INFUSION THERAPY | Age: 73
Discharge: HOME | End: 2018-09-18
Payer: MEDICARE

## 2018-09-18 VITALS
RESPIRATION RATE: 18 BRPM | SYSTOLIC BLOOD PRESSURE: 139 MMHG | DIASTOLIC BLOOD PRESSURE: 55 MMHG | OXYGEN SATURATION: 97 % | TEMPERATURE: 97.8 F

## 2018-09-18 LAB
ALBUMIN SERPL-MCNC: 3.8 G/DL (ref 3.4–5)
ALBUMIN/GLOB SERPL: 1.2 {RATIO} (ref 0.8–1.7)
ALP SERPL-CCNC: 91 U/L (ref 45–117)
ALT SERPL-CCNC: 28 U/L (ref 13–56)
ANION GAP SERPL CALC-SCNC: 11 MMOL/L (ref 3–18)
AST SERPL-CCNC: 21 U/L (ref 15–37)
BASO+EOS+MONOS # BLD AUTO: 0.8 K/UL (ref 0–2.3)
BASO+EOS+MONOS # BLD AUTO: 10 % (ref 0.1–17)
BILIRUB SERPL-MCNC: 0.4 MG/DL (ref 0.2–1)
BUN SERPL-MCNC: 17 MG/DL (ref 7–18)
BUN/CREAT SERPL: 20 (ref 12–20)
CALCIUM SERPL-MCNC: 8.7 MG/DL (ref 8.5–10.1)
CHLORIDE SERPL-SCNC: 106 MMOL/L (ref 100–108)
CO2 SERPL-SCNC: 25 MMOL/L (ref 21–32)
CREAT SERPL-MCNC: 0.84 MG/DL (ref 0.6–1.3)
DIFFERENTIAL METHOD BLD: ABNORMAL
ERYTHROCYTE [DISTWIDTH] IN BLOOD BY AUTOMATED COUNT: 13 % (ref 11.5–14.5)
GLOBULIN SER CALC-MCNC: 3.1 G/DL (ref 2–4)
GLUCOSE SERPL-MCNC: 72 MG/DL (ref 74–99)
HCT VFR BLD AUTO: 36.2 % (ref 36–48)
HGB BLD-MCNC: 11.9 G/DL (ref 12–16)
LYMPHOCYTES # BLD: 1.4 K/UL (ref 1.1–5.9)
LYMPHOCYTES NFR BLD: 16 % (ref 14–44)
MAGNESIUM SERPL-MCNC: 1.5 MG/DL (ref 1.6–2.6)
MCH RBC QN AUTO: 30.1 PG (ref 25–35)
MCHC RBC AUTO-ENTMCNC: 32.9 G/DL (ref 31–37)
MCV RBC AUTO: 91.6 FL (ref 78–102)
NEUTS SEG # BLD: 6.1 K/UL (ref 1.8–9.5)
NEUTS SEG NFR BLD: 74 % (ref 40–70)
PLATELET # BLD AUTO: 300 K/UL (ref 140–440)
POTASSIUM SERPL-SCNC: 4 MMOL/L (ref 3.5–5.5)
PROT SERPL-MCNC: 6.9 G/DL (ref 6.4–8.2)
RBC # BLD AUTO: 3.95 M/UL (ref 4.1–5.1)
SODIUM SERPL-SCNC: 142 MMOL/L (ref 136–145)
WBC # BLD AUTO: 8.3 K/UL (ref 4.5–13)

## 2018-09-18 PROCEDURE — 36591 DRAW BLOOD OFF VENOUS DEVICE: CPT

## 2018-09-18 PROCEDURE — 83735 ASSAY OF MAGNESIUM: CPT | Performed by: OBSTETRICS & GYNECOLOGY

## 2018-09-18 PROCEDURE — 77030012965 HC NDL HUBR BBMI -A

## 2018-09-18 PROCEDURE — 86304 IMMUNOASSAY TUMOR CA 125: CPT | Performed by: OBSTETRICS & GYNECOLOGY

## 2018-09-18 PROCEDURE — 85025 COMPLETE CBC W/AUTO DIFF WBC: CPT | Performed by: OBSTETRICS & GYNECOLOGY

## 2018-09-18 PROCEDURE — 80053 COMPREHEN METABOLIC PANEL: CPT | Performed by: OBSTETRICS & GYNECOLOGY

## 2018-09-18 PROCEDURE — 74011250636 HC RX REV CODE- 250/636: Performed by: OBSTETRICS & GYNECOLOGY

## 2018-09-18 RX ORDER — HEPARIN 100 UNIT/ML
500 SYRINGE INTRAVENOUS ONCE
Status: COMPLETED | OUTPATIENT
Start: 2018-09-18 | End: 2018-09-18

## 2018-09-18 RX ORDER — SODIUM CHLORIDE 0.9 % (FLUSH) 0.9 %
10-40 SYRINGE (ML) INJECTION AS NEEDED
Status: DISCONTINUED | OUTPATIENT
Start: 2018-09-18 | End: 2018-09-22 | Stop reason: HOSPADM

## 2018-09-18 RX ORDER — HEPARIN 100 UNIT/ML
SYRINGE INTRAVENOUS
Status: DISPENSED
Start: 2018-09-18 | End: 2018-09-18

## 2018-09-18 RX ADMIN — Medication 10 ML: at 10:38

## 2018-09-18 RX ADMIN — HEPARIN 500 UNITS: 100 SYRINGE at 10:38

## 2018-09-18 NOTE — PROGRESS NOTES
[facility] Progress Note Date: 2018 Name: Becky Gutierrez MRN: [id number] : 1945 Port flush and labs Ms. Fernanda Lal to [facility], ambulatory, at 56 accompanied by her . Pt was assessed and education was provided. Ms. Keri Hancock vitals were reviewed. Visit Vitals  /55 (BP 1 Location: Right arm, BP Patient Position: Sitting)  Temp 97.8 °F (36.6 °C)  Resp 18  SpO2 97%  Breastfeeding No  
 
 
Upper left chest mediport was accessed with 20 gauge 0.75 inch العراقي after chloroprep. Port flushed easily and had brisk blood return. Blood drawn off and sent to lab for CBC, CMP, Mag, and  after 10 ml waste per written orders. Recent Results (from the past 12 hour(s)) CBC WITH 3 PART DIFF Collection Time: 18 10:37 AM  
Result Value Ref Range WBC 8.3 4.5 - 13.0 K/uL  
 RBC 3.95 (L) 4.10 - 5.10 M/uL  
 HGB 11.9 (L) 12.0 - 16.0 g/dL HCT 36.2 36 - 48 % MCV 91.6 78 - 102 FL  
 MCH 30.1 25.0 - 35.0 PG  
 MCHC 32.9 31 - 37 g/dL  
 RDW 13.0 11.5 - 14.5 % PLATELET 278 192 - 354 K/uL NEUTROPHILS 74 (H) 40 - 70 % MIXED CELLS 10 0.1 - 17 % LYMPHOCYTES 16 14 - 44 % ABS. NEUTROPHILS 6.1 1.8 - 9.5 K/UL  
 ABS. MIXED CELLS 0.8 0.0 - 2.3 K/uL  
 ABS. LYMPHOCYTES 1.4 1.1 - 5.9 K/UL  
 DF AUTOMATED Mediport flushed with NS 20 ml and Heparin 500 units then de-accessed. No irritation or bleeding noted. Band-Aid applied. Ms. Fernanda Lal tolerated the procedure, and had no complaints. Patient armband removed and shredded. Ms. Fernanda Lal was discharged from [place] [address] in stable condition at [facility]. She is to return on 10/30/18 at 56 for her next appointment. Denisha Palm RN 2018

## 2018-09-19 LAB — CANCER AG125 SERPL-ACNC: 16.8 U/ML (ref 0–38.1)

## 2018-10-09 ENCOUNTER — APPOINTMENT (OUTPATIENT)
Dept: INFUSION THERAPY | Age: 73
End: 2018-10-09
Payer: MEDICARE

## 2018-10-30 ENCOUNTER — HOSPITAL ENCOUNTER (OUTPATIENT)
Dept: INFUSION THERAPY | Age: 73
Discharge: HOME | End: 2018-10-30
Payer: MEDICARE

## 2018-10-30 VITALS
DIASTOLIC BLOOD PRESSURE: 63 MMHG | OXYGEN SATURATION: 96 % | TEMPERATURE: 98.2 F | RESPIRATION RATE: 18 BRPM | SYSTOLIC BLOOD PRESSURE: 146 MMHG | HEART RATE: 80 BPM

## 2018-10-30 PROCEDURE — 77030012965 HC NDL HUBR BBMI -A

## 2018-10-30 PROCEDURE — 74011250636 HC RX REV CODE- 250/636: Performed by: OBSTETRICS & GYNECOLOGY

## 2018-10-30 PROCEDURE — 96523 IRRIG DRUG DELIVERY DEVICE: CPT

## 2018-10-30 RX ORDER — SODIUM CHLORIDE 0.9 % (FLUSH) 0.9 %
5-10 SYRINGE (ML) INJECTION AS NEEDED
Status: DISCONTINUED | OUTPATIENT
Start: 2018-10-30 | End: 2018-11-03 | Stop reason: HOSPADM

## 2018-10-30 RX ORDER — HEPARIN 100 UNIT/ML
500 SYRINGE INTRAVENOUS AS NEEDED
Status: DISCONTINUED | OUTPATIENT
Start: 2018-10-30 | End: 2018-11-03 | Stop reason: HOSPADM

## 2018-10-30 RX ADMIN — Medication 10 ML: at 10:45

## 2018-10-30 RX ADMIN — Medication 500 UNITS: at 10:45

## 2018-10-30 NOTE — PROGRESS NOTES
[facility] Short Consult Note (Labs/Type & Cross/Portflush/Antibiotic/Non-Chemo injections) Date: 2018 Name: Alcides Farfan MRN: [id number] : 1945 Treatment: Port Flush Ms. Verna Nageotte was assessed and education was provided. Ms. Bailee Arndt vitals were reviewed and patient was observed for 5 minutes prior to treatment. Visit Vitals /63 (BP 1 Location: Left arm, BP Patient Position: Sitting) Pulse 80 Temp 98.2 °F (36.8 °C) Resp 18 SpO2 96% Breastfeeding? No  
 
Ms. Verna Nageotte' port was accessed and flushed per orders, per protocol without complications. Band aid applied to site. Ms. Verna Nageotte tolerated the port flush, and had no complaints. Ms. Verna Nageotte was discharged from [place] [address] in stable condition at 1050. She is to return on 18 at 56 for her next appointment. Armband removed and shredded. Lisa Moreau RN 2018 10:56 AM

## 2018-12-11 ENCOUNTER — HOSPITAL ENCOUNTER (OUTPATIENT)
Dept: INFUSION THERAPY | Age: 73
Discharge: HOME | End: 2018-12-11
Payer: MEDICARE

## 2018-12-11 VITALS
TEMPERATURE: 97.8 F | HEART RATE: 78 BPM | SYSTOLIC BLOOD PRESSURE: 146 MMHG | DIASTOLIC BLOOD PRESSURE: 71 MMHG | OXYGEN SATURATION: 98 % | RESPIRATION RATE: 18 BRPM

## 2018-12-11 PROCEDURE — 36591 DRAW BLOOD OFF VENOUS DEVICE: CPT

## 2018-12-11 PROCEDURE — 77030012965 HC NDL HUBR BBMI -A

## 2018-12-11 PROCEDURE — 86304 IMMUNOASSAY TUMOR CA 125: CPT

## 2018-12-11 PROCEDURE — 74011250636 HC RX REV CODE- 250/636: Performed by: OBSTETRICS & GYNECOLOGY

## 2018-12-11 PROCEDURE — 36415 COLL VENOUS BLD VENIPUNCTURE: CPT

## 2018-12-11 RX ORDER — SODIUM CHLORIDE 0.9 % (FLUSH) 0.9 %
10-40 SYRINGE (ML) INJECTION AS NEEDED
Status: DISCONTINUED | OUTPATIENT
Start: 2018-12-11 | End: 2018-12-15 | Stop reason: HOSPADM

## 2018-12-11 RX ORDER — HEPARIN 100 UNIT/ML
500 SYRINGE INTRAVENOUS ONCE
Status: COMPLETED | OUTPATIENT
Start: 2018-12-11 | End: 2018-12-11

## 2018-12-11 RX ORDER — HEPARIN 100 UNIT/ML
SYRINGE INTRAVENOUS
Status: DISPENSED
Start: 2018-12-11 | End: 2018-12-11

## 2018-12-11 RX ADMIN — HEPARIN 500 UNITS: 100 SYRINGE at 10:28

## 2018-12-11 RX ADMIN — Medication 10 ML: at 10:28

## 2018-12-11 NOTE — PROGRESS NOTES
[facility] Progress Note Date: 2018 Name: Omar Larid MRN: [id number] : 1945 Port flush and lab draw Ms. Arina Boateng to [facility], ambulatory, at 56 accompanied by her . Pt was assessed and education was provided. Ms. Jennifer Holden vitals were reviewed. Visit Vitals /71 (BP 1 Location: Right arm, BP Patient Position: Sitting) Pulse 78 Temp 97.8 °F (36.6 °C) Resp 18 SpO2 98% Breastfeeding? No  
 
 
Upper left chest mediport was accessed with 20 gauge 0.75 inch العراقي after chloroprep. Port flushed easily and had brisk blood return. Blood drawn off and sent to lab for  after 10 ml waste per written orders. Mediport flushed with NS 20 ml and Heparin 500 units then de-accessed. No irritation or bleeding noted. Band-Aid applied. Ms. Arina Boateng tolerated the procedure, and had no complaints. Patient armband removed and shredded. Ms. Arina Boateng was discharged from [place] [address] in stable condition at 1030. She is to return on 2019  for her next appointment. Kassidy Webber RN 2018

## 2018-12-12 LAB — CANCER AG125 SERPL-ACNC: 18.9 U/ML (ref 0–38.1)

## 2019-01-08 ENCOUNTER — HOSPITAL ENCOUNTER (OUTPATIENT)
Dept: CT IMAGING | Age: 74
Discharge: HOME | End: 2019-01-08
Attending: OBSTETRICS & GYNECOLOGY
Payer: MEDICARE

## 2019-01-08 DIAGNOSIS — C54.1 ENDOMETRIAL MALIGNANT NEOPLASM (HCC): ICD-10-CM

## 2019-01-08 LAB — CREAT UR-MCNC: 0.7 MG/DL (ref 0.6–1.3)

## 2019-01-08 PROCEDURE — 74011636320 HC RX REV CODE- 636/320: Performed by: OBSTETRICS & GYNECOLOGY

## 2019-01-08 PROCEDURE — 74011000255 HC RX REV CODE- 255: Performed by: OBSTETRICS & GYNECOLOGY

## 2019-01-08 PROCEDURE — 74177 CT ABD & PELVIS W/CONTRAST: CPT

## 2019-01-08 PROCEDURE — 82565 ASSAY OF CREATININE: CPT

## 2019-01-08 RX ORDER — BARIUM SULFATE 20 MG/ML
900 SUSPENSION ORAL
Status: COMPLETED | OUTPATIENT
Start: 2019-01-08 | End: 2019-01-08

## 2019-01-08 RX ADMIN — IOPAMIDOL 100 ML: 612 INJECTION, SOLUTION INTRAVENOUS at 09:49

## 2019-01-08 RX ADMIN — BARIUM SULFATE 900 ML: 20 SUSPENSION ORAL at 09:49

## 2019-01-21 ENCOUNTER — HOSPITAL ENCOUNTER (OUTPATIENT)
Dept: INFUSION THERAPY | Age: 74
Discharge: HOME | End: 2019-01-21
Payer: MEDICARE

## 2019-01-21 VITALS
HEART RATE: 72 BPM | TEMPERATURE: 98.5 F | DIASTOLIC BLOOD PRESSURE: 61 MMHG | SYSTOLIC BLOOD PRESSURE: 134 MMHG | RESPIRATION RATE: 18 BRPM | OXYGEN SATURATION: 97 %

## 2019-01-21 PROCEDURE — 74011250636 HC RX REV CODE- 250/636: Performed by: INTERNAL MEDICINE

## 2019-01-21 PROCEDURE — 96523 IRRIG DRUG DELIVERY DEVICE: CPT

## 2019-01-21 PROCEDURE — 77030012965 HC NDL HUBR BBMI -A

## 2019-01-21 RX ORDER — HEPARIN 100 UNIT/ML
500 SYRINGE INTRAVENOUS AS NEEDED
Status: DISCONTINUED | OUTPATIENT
Start: 2019-01-21 | End: 2019-01-25 | Stop reason: HOSPADM

## 2019-01-21 RX ORDER — HEPARIN 100 UNIT/ML
SYRINGE INTRAVENOUS
Status: DISPENSED
Start: 2019-01-21 | End: 2019-01-22

## 2019-01-21 RX ORDER — SODIUM CHLORIDE 0.9 % (FLUSH) 0.9 %
10-40 SYRINGE (ML) INJECTION AS NEEDED
Status: DISCONTINUED | OUTPATIENT
Start: 2019-01-21 | End: 2019-01-25 | Stop reason: HOSPADM

## 2019-01-21 RX ADMIN — Medication 500 UNITS: at 14:01

## 2019-01-21 RX ADMIN — Medication 20 ML: at 14:00

## 2019-01-22 ENCOUNTER — HOSPITAL ENCOUNTER (OUTPATIENT)
Dept: PET IMAGING | Age: 74
Discharge: HOME | End: 2019-01-22
Attending: OBSTETRICS & GYNECOLOGY
Payer: MEDICARE

## 2019-01-22 ENCOUNTER — APPOINTMENT (OUTPATIENT)
Dept: INFUSION THERAPY | Age: 74
End: 2019-01-22
Payer: MEDICARE

## 2019-01-22 DIAGNOSIS — C56.1 MALIGNANT NEOPLASM OF RIGHT OVARY (HCC): ICD-10-CM

## 2019-01-22 PROCEDURE — A9552 F18 FDG: HCPCS

## 2019-03-05 ENCOUNTER — HOSPITAL ENCOUNTER (OUTPATIENT)
Dept: INFUSION THERAPY | Age: 74
Discharge: HOME | End: 2019-03-05
Payer: MEDICARE

## 2019-03-05 VITALS
SYSTOLIC BLOOD PRESSURE: 140 MMHG | DIASTOLIC BLOOD PRESSURE: 63 MMHG | TEMPERATURE: 97.5 F | RESPIRATION RATE: 18 BRPM | OXYGEN SATURATION: 98 % | HEART RATE: 64 BPM

## 2019-03-05 PROCEDURE — 36591 DRAW BLOOD OFF VENOUS DEVICE: CPT

## 2019-03-05 PROCEDURE — 86304 IMMUNOASSAY TUMOR CA 125: CPT

## 2019-03-05 PROCEDURE — 74011250636 HC RX REV CODE- 250/636: Performed by: OBSTETRICS & GYNECOLOGY

## 2019-03-05 PROCEDURE — 36415 COLL VENOUS BLD VENIPUNCTURE: CPT

## 2019-03-05 PROCEDURE — 77030012965 HC NDL HUBR BBMI -A

## 2019-03-05 RX ORDER — HEPARIN 100 UNIT/ML
500 SYRINGE INTRAVENOUS ONCE
Status: COMPLETED | OUTPATIENT
Start: 2019-03-05 | End: 2019-03-05

## 2019-03-05 RX ORDER — SODIUM CHLORIDE 0.9 % (FLUSH) 0.9 %
10-40 SYRINGE (ML) INJECTION AS NEEDED
Status: DISCONTINUED | OUTPATIENT
Start: 2019-03-05 | End: 2019-03-09 | Stop reason: HOSPADM

## 2019-03-05 RX ADMIN — HEPARIN 500 UNITS: 100 SYRINGE at 10:47

## 2019-03-05 RX ADMIN — Medication 10 ML: at 10:46

## 2019-03-05 NOTE — PROGRESS NOTES
[facility] Progress Note    Date: 2019    Name: Angelo Morgan    MRN: [id number]         : 1945    Port flush and lab draw    Ms. Deena Nur to [facility], at 21 [phone] accompanied by her . Pt was assessed and education was provided. Ms. Vannesa Dumont vitals were reviewed. Visit Vitals  /63 (BP 1 Location: Left arm)   Pulse 64   Temp 97.5 °F (36.4 °C)   Resp 18   SpO2 98%       Upper right chest mediport was accessed with 20 gauge 0.75 inch العراقي after chloroprep. Port flushed easily and had brisk blood return. Blood drawn off and sent to lab for  after 10 ml waste per written orders. Mediport flushed with NS 20 ml and Heparin 500 units then de-accessed. No irritation or bleeding noted. Band-Aid applied. Ms. Deena Nur tolerated the procedure, and had no complaints. Patient armband removed and shredded. Ms. Deena Nur was discharged from [place] [address] in stable condition at 1050. She is to return on 19 at 21 [phone] for her next appointment.     Ishaan Barrientos RN  2019

## 2019-03-06 LAB — CANCER AG125 SERPL-ACNC: 17.4 U/ML (ref 0–38.1)

## 2019-04-16 ENCOUNTER — HOSPITAL ENCOUNTER (OUTPATIENT)
Dept: INFUSION THERAPY | Age: 74
Discharge: HOME | End: 2019-04-16
Payer: MEDICARE

## 2019-04-16 VITALS
OXYGEN SATURATION: 95 % | SYSTOLIC BLOOD PRESSURE: 146 MMHG | HEART RATE: 68 BPM | RESPIRATION RATE: 18 BRPM | DIASTOLIC BLOOD PRESSURE: 55 MMHG | TEMPERATURE: 98.4 F

## 2019-04-16 PROCEDURE — 77030012965 HC NDL HUBR BBMI -A

## 2019-04-16 PROCEDURE — 96523 IRRIG DRUG DELIVERY DEVICE: CPT

## 2019-04-16 PROCEDURE — 74011250636 HC RX REV CODE- 250/636: Performed by: OBSTETRICS & GYNECOLOGY

## 2019-04-16 RX ORDER — SODIUM CHLORIDE 0.9 % (FLUSH) 0.9 %
10 SYRINGE (ML) INJECTION AS NEEDED
Status: DISCONTINUED | OUTPATIENT
Start: 2019-04-16 | End: 2019-04-20 | Stop reason: HOSPADM

## 2019-04-16 RX ORDER — HEPARIN 100 UNIT/ML
500 SYRINGE INTRAVENOUS ONCE
Status: COMPLETED | OUTPATIENT
Start: 2019-04-16 | End: 2019-04-16

## 2019-04-16 RX ADMIN — Medication 10 ML: at 10:41

## 2019-04-16 RX ADMIN — Medication 500 UNITS: at 10:42

## 2019-04-16 NOTE — PROGRESS NOTES
[facility] Progress Note Date: 2019 Name: Erinn Yeh MRN: [id number] : 1945 Port flush every 6 weeks Ms. Katiuska Hansen was assessed and education was provided. Ms. Toan Mauricio vitals were reviewed. Visit Vitals /55 (BP 1 Location: Left arm) Pulse 68 Temp 98.4 °F (36.9 °C) Resp 18 SpO2 95% Mediport at left upper chest accessed with 20 gauge, 3.4 inch Sebastian needle w/o difficulty. .  Good blood return obtained, followed with 20 ml NS flush and instilled 500 units Heparin. Port de-accessed, no irritation or drainage noted at site, band aid applied. Ms. Katiuska Hansen tolerated the procedure, and had no complaints. Patient armband removed and shredded. Ms. Katiuska Hansen was discharged from [place] [address] in stable condition at 1050  She is to return on 2019  for her next appointment for port flush and labs draw. Abbey Naranjo RN 2019 
3:19 PM

## 2019-05-21 ENCOUNTER — HOSPITAL ENCOUNTER (OUTPATIENT)
Dept: INFUSION THERAPY | Age: 74
Discharge: HOME | End: 2019-05-21
Payer: MEDICARE

## 2019-05-21 VITALS
TEMPERATURE: 97.3 F | RESPIRATION RATE: 18 BRPM | DIASTOLIC BLOOD PRESSURE: 77 MMHG | OXYGEN SATURATION: 97 % | HEART RATE: 73 BPM | SYSTOLIC BLOOD PRESSURE: 155 MMHG

## 2019-05-21 PROCEDURE — 36415 COLL VENOUS BLD VENIPUNCTURE: CPT

## 2019-05-21 PROCEDURE — 74011250636 HC RX REV CODE- 250/636: Performed by: OBSTETRICS & GYNECOLOGY

## 2019-05-21 PROCEDURE — 86304 IMMUNOASSAY TUMOR CA 125: CPT

## 2019-05-21 PROCEDURE — 36591 DRAW BLOOD OFF VENOUS DEVICE: CPT

## 2019-05-21 PROCEDURE — 77030012965 HC NDL HUBR BBMI -A

## 2019-05-21 RX ORDER — HEPARIN 100 UNIT/ML
500 SYRINGE INTRAVENOUS ONCE
Status: COMPLETED | OUTPATIENT
Start: 2019-05-21 | End: 2019-05-21

## 2019-05-21 RX ORDER — SODIUM CHLORIDE 0.9 % (FLUSH) 0.9 %
5-10 SYRINGE (ML) INJECTION AS NEEDED
Status: DISCONTINUED | OUTPATIENT
Start: 2019-05-21 | End: 2019-05-25 | Stop reason: HOSPADM

## 2019-05-21 RX ADMIN — Medication 500 UNITS: at 10:45

## 2019-05-21 RX ADMIN — Medication 10 ML: at 10:45

## 2019-05-21 NOTE — PROGRESS NOTES
[facility] Short Consult Note                  (Labs/Type & Cross/Portflush/Antibiotic/Non-Chemo injections)      Date: May 21, 2019    Name: Estefania Caceres    MRN: [id number]         : 1945    Treatment: [facility] Flush/CA-125      Ms. Magalie Rivera was assessed and education was provided. Ms. HolmYen Len vitals were reviewed and patient was observed for 5 minutes prior to treatment. Visit Vitals  /77 (BP 1 Location: Right arm, BP Patient Position: Sitting)   Pulse 73   Temp 97.3 °F (36.3 °C)   Resp 18   SpO2 97%     Patient's port was accessed and blood was drawn per orders, per protocol without complications. Ms. Magalie Rivera tolerated the procedure, and had no complaints. Ms. Magalie Rivera was discharged from [place] [address] in stable condition at 1055. She is to return on 19 at 100 for her next appointment.     John Sanchez RN  May 21, 2019  11:01 AM

## 2019-05-22 LAB — CANCER AG125 SERPL-ACNC: 11 U/ML (ref 1.5–35)

## 2019-07-02 ENCOUNTER — HOSPITAL ENCOUNTER (OUTPATIENT)
Dept: INFUSION THERAPY | Age: 74
Discharge: HOME | End: 2019-07-02
Payer: MEDICARE

## 2019-07-02 VITALS
OXYGEN SATURATION: 98 % | HEART RATE: 70 BPM | DIASTOLIC BLOOD PRESSURE: 63 MMHG | RESPIRATION RATE: 18 BRPM | TEMPERATURE: 98.1 F | SYSTOLIC BLOOD PRESSURE: 149 MMHG

## 2019-07-02 PROCEDURE — 96523 IRRIG DRUG DELIVERY DEVICE: CPT

## 2019-07-02 PROCEDURE — 77030012965 HC NDL HUBR BBMI -A

## 2019-07-02 PROCEDURE — 74011250636 HC RX REV CODE- 250/636: Performed by: OBSTETRICS & GYNECOLOGY

## 2019-07-02 RX ORDER — HEPARIN 100 UNIT/ML
500 SYRINGE INTRAVENOUS ONCE
Status: COMPLETED | OUTPATIENT
Start: 2019-07-02 | End: 2019-07-02

## 2019-07-02 RX ORDER — SODIUM CHLORIDE 0.9 % (FLUSH) 0.9 %
5-10 SYRINGE (ML) INJECTION AS NEEDED
Status: DISCONTINUED | OUTPATIENT
Start: 2019-07-02 | End: 2019-07-06 | Stop reason: HOSPADM

## 2019-07-02 RX ADMIN — Medication 500 UNITS: at 10:40

## 2019-07-02 RX ADMIN — Medication 10 ML: at 10:40

## 2019-07-02 NOTE — PROGRESS NOTES
[facility] Progress Note    Date: 2019    Name: Gianni Wilson              MRN: [id number]              : 1945    Port flush      Ms. Luis Castro was assessed and education was provided. Ms. Luis Castro denies any new issues or concerns. Ms. Emmy Chicas vitals were reviewed. Visit Vitals  /63 (BP 1 Location: Right arm, BP Patient Position: Sitting)   Pulse 70   Temp 98.1 °F (36.7 °C)   Resp 18   SpO2 98%     Mediport accessed and flushed per orders, per protocol without complications. Band aid applied to site. Ms. Luis Castro tolerated port flush, and had no complaints at this time. Ms. Luis Castro was discharged from [place] [address] in stable condition at 1045. She is to return on 19 at 56 for her next appointment. Armband removed and shredded.      Salinas Valles RN  2019  1:01 PM

## 2019-08-02 ENCOUNTER — HOSPITAL ENCOUNTER (OUTPATIENT)
Dept: INFUSION THERAPY | Age: 74
Discharge: HOME | End: 2019-08-02
Payer: MEDICARE

## 2019-08-02 VITALS
BODY MASS INDEX: 36.71 KG/M2 | DIASTOLIC BLOOD PRESSURE: 76 MMHG | RESPIRATION RATE: 16 BRPM | HEART RATE: 78 BPM | TEMPERATURE: 96.1 F | OXYGEN SATURATION: 96 % | SYSTOLIC BLOOD PRESSURE: 165 MMHG | WEIGHT: 224 LBS

## 2019-08-02 PROCEDURE — 77030012965 HC NDL HUBR BBMI -A

## 2019-08-02 PROCEDURE — 74011250636 HC RX REV CODE- 250/636: Performed by: OBSTETRICS & GYNECOLOGY

## 2019-08-02 PROCEDURE — 96523 IRRIG DRUG DELIVERY DEVICE: CPT

## 2019-08-02 RX ORDER — SODIUM CHLORIDE 9 MG/ML
10 INJECTION INTRAMUSCULAR; INTRAVENOUS; SUBCUTANEOUS AS NEEDED
Status: DISCONTINUED | OUTPATIENT
Start: 2019-08-02 | End: 2019-08-03 | Stop reason: HOSPADM

## 2019-08-02 RX ORDER — HEPARIN 100 UNIT/ML
SYRINGE INTRAVENOUS
Status: DISPENSED
Start: 2019-08-02 | End: 2019-08-02

## 2019-08-02 RX ORDER — HEPARIN 100 UNIT/ML
500 SYRINGE INTRAVENOUS ONCE
Status: COMPLETED | OUTPATIENT
Start: 2019-08-02 | End: 2019-08-02

## 2019-08-02 RX ADMIN — HEPARIN 500 UNITS: 100 SYRINGE at 10:08

## 2019-08-02 RX ADMIN — SODIUM CHLORIDE 10 ML: 9 INJECTION INTRAMUSCULAR; INTRAVENOUS; SUBCUTANEOUS at 10:07

## 2019-08-02 NOTE — PROGRESS NOTES
[facility] Progress Note    Date: 2019    Name: Elle Pinzon              MRN: [id number]              : 1945           Ms. Dona Bradshaw was assessed and education was provided. She complained of recently having frequent stools after \"eating a different chicen salad, and noticed the yesterday when I wiped, I saw some blood\". Patient denies any bleeding now, dizziness, palpatations. She knows to go the ED or see her PCP for any changes or more bleeding. She agreed with plan. She also will be seeing Dr. Lashell Lovelace next week and \"will let him know\". She said her BP was elevated as \"I had to drive here by myself. Ms. Alexis Dorman vitals were reviewed. Visit Vitals  /76 (BP 1 Location: Right arm, BP Patient Position: Sitting)   Pulse 78   Temp 96.1 °F (35.6 °C)   Resp 16   Wt 101.6 kg (224 lb)   SpO2 96%   BMI 36.71 kg/m²           Ms. Terrazas tolerated procedure. Ms. Dona Bradshaw was discharged from [place] [address] in stable condition at 10:10  She is to return on 9-10-19 at 10:30 for her next appointment.     Aviva Simmons RN  2019  1:09 PM

## 2019-08-06 ENCOUNTER — APPOINTMENT (OUTPATIENT)
Dept: INFUSION THERAPY | Age: 74
End: 2019-08-06
Payer: MEDICARE

## 2019-08-20 ENCOUNTER — HOSPITAL ENCOUNTER (OUTPATIENT)
Dept: CT IMAGING | Age: 74
Discharge: HOME | End: 2019-08-20
Attending: OBSTETRICS & GYNECOLOGY
Payer: MEDICARE

## 2019-08-20 DIAGNOSIS — C56.1 MALIGNANT NEOPLASM OF RIGHT OVARY (HCC): ICD-10-CM

## 2019-08-20 PROCEDURE — 74011636320 HC RX REV CODE- 636/320: Performed by: OBSTETRICS & GYNECOLOGY

## 2019-08-20 PROCEDURE — 74011000255 HC RX REV CODE- 255: Performed by: OBSTETRICS & GYNECOLOGY

## 2019-08-20 PROCEDURE — 74177 CT ABD & PELVIS W/CONTRAST: CPT

## 2019-08-20 PROCEDURE — 82565 ASSAY OF CREATININE: CPT

## 2019-08-20 RX ORDER — BARIUM SULFATE 20 MG/ML
900 SUSPENSION ORAL
Status: COMPLETED | OUTPATIENT
Start: 2019-08-20 | End: 2019-08-20

## 2019-08-20 RX ADMIN — IOPAMIDOL 100 ML: 612 INJECTION, SOLUTION INTRAVENOUS at 12:37

## 2019-08-20 RX ADMIN — BARIUM SULFATE 900 ML: 20 SUSPENSION ORAL at 12:27

## 2019-08-21 LAB — CREAT UR-MCNC: 0.9 MG/DL (ref 0.6–1.3)

## 2019-08-29 ENCOUNTER — HOSPITAL ENCOUNTER (OUTPATIENT)
Dept: PREADMISSION TESTING | Age: 74
Discharge: HOME | End: 2019-08-29
Payer: MEDICARE

## 2019-08-29 VITALS — WEIGHT: 224 LBS | BODY MASS INDEX: 39.69 KG/M2 | HEIGHT: 63 IN

## 2019-08-29 LAB
ALBUMIN SERPL-MCNC: 4.1 G/DL (ref 3.4–5)
ALBUMIN/GLOB SERPL: 1.7 {RATIO} (ref 0.8–1.7)
ALP SERPL-CCNC: 96 U/L (ref 45–117)
ALT SERPL-CCNC: 26 U/L (ref 13–56)
ANION GAP SERPL CALC-SCNC: 6 MMOL/L (ref 3–18)
AST SERPL-CCNC: 18 U/L (ref 10–38)
ATRIAL RATE: 68 BPM
BASOPHILS # BLD: 0 K/UL (ref 0–0.1)
BASOPHILS NFR BLD: 0 % (ref 0–2)
BILIRUB SERPL-MCNC: 0.4 MG/DL (ref 0.2–1)
BUN SERPL-MCNC: 22 MG/DL (ref 7–18)
BUN/CREAT SERPL: 22 (ref 12–20)
CALCIUM SERPL-MCNC: 9.1 MG/DL (ref 8.5–10.1)
CALCULATED P AXIS, ECG09: 55 DEGREES
CALCULATED R AXIS, ECG10: 62 DEGREES
CALCULATED T AXIS, ECG11: 71 DEGREES
CHLORIDE SERPL-SCNC: 106 MMOL/L (ref 100–111)
CO2 SERPL-SCNC: 26 MMOL/L (ref 21–32)
CREAT SERPL-MCNC: 0.99 MG/DL (ref 0.6–1.3)
DIAGNOSIS, 93000: NORMAL
DIFFERENTIAL METHOD BLD: ABNORMAL
EOSINOPHIL # BLD: 0.2 K/UL (ref 0–0.4)
EOSINOPHIL NFR BLD: 3 % (ref 0–5)
ERYTHROCYTE [DISTWIDTH] IN BLOOD BY AUTOMATED COUNT: 13.3 % (ref 11.6–14.5)
GLOBULIN SER CALC-MCNC: 2.4 G/DL (ref 2–4)
GLUCOSE SERPL-MCNC: 80 MG/DL (ref 74–99)
HCT VFR BLD AUTO: 38.3 % (ref 35–45)
HGB BLD-MCNC: 12.4 G/DL (ref 12–16)
LYMPHOCYTES # BLD: 1.3 K/UL (ref 0.9–3.6)
LYMPHOCYTES NFR BLD: 17 % (ref 21–52)
MCH RBC QN AUTO: 28.8 PG (ref 24–34)
MCHC RBC AUTO-ENTMCNC: 32.4 G/DL (ref 31–37)
MCV RBC AUTO: 88.9 FL (ref 74–97)
MONOCYTES # BLD: 0.7 K/UL (ref 0.05–1.2)
MONOCYTES NFR BLD: 9 % (ref 3–10)
NEUTS SEG # BLD: 5.2 K/UL (ref 1.8–8)
NEUTS SEG NFR BLD: 71 % (ref 40–73)
P-R INTERVAL, ECG05: 164 MS
PLATELET # BLD AUTO: 344 K/UL (ref 135–420)
PMV BLD AUTO: 10.5 FL (ref 9.2–11.8)
POTASSIUM SERPL-SCNC: 4.3 MMOL/L (ref 3.5–5.5)
PROT SERPL-MCNC: 6.5 G/DL (ref 6.4–8.2)
Q-T INTERVAL, ECG07: 386 MS
QRS DURATION, ECG06: 84 MS
QTC CALCULATION (BEZET), ECG08: 410 MS
RBC # BLD AUTO: 4.31 M/UL (ref 4.2–5.3)
SODIUM SERPL-SCNC: 138 MMOL/L (ref 136–145)
VENTRICULAR RATE, ECG03: 68 BPM
WBC # BLD AUTO: 7.4 K/UL (ref 4.6–13.2)

## 2019-08-29 PROCEDURE — 80053 COMPREHEN METABOLIC PANEL: CPT

## 2019-08-29 PROCEDURE — 93005 ELECTROCARDIOGRAM TRACING: CPT

## 2019-08-29 PROCEDURE — 85025 COMPLETE CBC W/AUTO DIFF WBC: CPT

## 2019-08-29 RX ORDER — SODIUM CHLORIDE, SODIUM LACTATE, POTASSIUM CHLORIDE, CALCIUM CHLORIDE 600; 310; 30; 20 MG/100ML; MG/100ML; MG/100ML; MG/100ML
125 INJECTION, SOLUTION INTRAVENOUS CONTINUOUS
Status: CANCELLED | OUTPATIENT
Start: 2019-08-29

## 2019-08-29 RX ORDER — CLINDAMYCIN PHOSPHATE 900 MG/50ML
900 INJECTION, SOLUTION INTRAVENOUS ONCE
Status: CANCELLED | OUTPATIENT
Start: 2019-09-17 | End: 2019-09-17

## 2019-09-10 ENCOUNTER — HOSPITAL ENCOUNTER (OUTPATIENT)
Dept: INFUSION THERAPY | Age: 74
Discharge: HOME | End: 2019-09-10
Payer: MEDICARE

## 2019-09-10 VITALS
SYSTOLIC BLOOD PRESSURE: 137 MMHG | OXYGEN SATURATION: 97 % | TEMPERATURE: 96.6 F | HEART RATE: 77 BPM | DIASTOLIC BLOOD PRESSURE: 72 MMHG | RESPIRATION RATE: 16 BRPM

## 2019-09-10 PROCEDURE — 74011250636 HC RX REV CODE- 250/636: Performed by: OBSTETRICS & GYNECOLOGY

## 2019-09-10 PROCEDURE — 77030012965 HC NDL HUBR BBMI -A

## 2019-09-10 PROCEDURE — 96523 IRRIG DRUG DELIVERY DEVICE: CPT

## 2019-09-10 RX ORDER — HEPARIN 100 UNIT/ML
500 SYRINGE INTRAVENOUS AS NEEDED
Status: DISCONTINUED | OUTPATIENT
Start: 2019-09-10 | End: 2019-09-14 | Stop reason: HOSPADM

## 2019-09-10 RX ORDER — HEPARIN 100 UNIT/ML
SYRINGE INTRAVENOUS
Status: DISPENSED
Start: 2019-09-10 | End: 2019-09-10

## 2019-09-10 RX ORDER — SODIUM CHLORIDE 0.9 % (FLUSH) 0.9 %
10 SYRINGE (ML) INJECTION AS NEEDED
Status: DISCONTINUED | OUTPATIENT
Start: 2019-09-10 | End: 2019-09-14 | Stop reason: HOSPADM

## 2019-09-10 RX ADMIN — Medication 10 ML: at 10:40

## 2019-09-10 RX ADMIN — Medication 500 UNITS: at 11:00

## 2019-09-10 NOTE — PROGRESS NOTES
[facility] Progress Note    Date: September 10, 2019    Name: Maricarmen Philip              MRN: [id number]              : 1945    Port flush      Ms. Rin Villarreal was assessed and education was provided. Ms. Rin Villarreal denies any new issues or concerns. Ms. Stevie Alvares vitals were reviewed. Visit Vitals  /72 (BP 1 Location: Right arm, BP Patient Position: Sitting)   Pulse 77   Temp 96.6 °F (35.9 °C)   Resp 16   SpO2 97%     Mediport accessed and flushed per orders, per protocol without complications. Band aid applied to site. Ms. Rin Villarreal tolerated port flush, and had no complaints at this time.  not collected today. Pt states \" I had my  lab drawn on 19 and it is only due every 12 weeks. \"  Documentation of lab draw verified by this writer. Ms. Rin Villarreal was discharged from [place] [address] in stable condition at 1050. She is to return on 10/22/19 at 1030 for her next appointment. Armband removed and shredded.      Mariana Driver RN  September 10, 2019  1:01 PM

## 2019-09-16 ENCOUNTER — ANESTHESIA EVENT (OUTPATIENT)
Dept: SURGERY | Age: 74
End: 2019-09-16
Payer: MEDICARE

## 2019-09-17 ENCOUNTER — ANESTHESIA (OUTPATIENT)
Dept: SURGERY | Age: 74
End: 2019-09-17
Payer: MEDICARE

## 2019-09-17 ENCOUNTER — HOSPITAL ENCOUNTER (OUTPATIENT)
Age: 74
Setting detail: OUTPATIENT SURGERY
Discharge: HOME | End: 2019-09-17
Attending: OBSTETRICS & GYNECOLOGY | Admitting: OBSTETRICS & GYNECOLOGY
Payer: MEDICARE

## 2019-09-17 VITALS
TEMPERATURE: 97 F | OXYGEN SATURATION: 97 % | HEIGHT: 63 IN | BODY MASS INDEX: 38.55 KG/M2 | WEIGHT: 217.6 LBS | DIASTOLIC BLOOD PRESSURE: 49 MMHG | RESPIRATION RATE: 12 BRPM | SYSTOLIC BLOOD PRESSURE: 124 MMHG | HEART RATE: 78 BPM

## 2019-09-17 DIAGNOSIS — G89.18 POST-OP PAIN: Primary | ICD-10-CM

## 2019-09-17 PROCEDURE — 74011000250 HC RX REV CODE- 250

## 2019-09-17 PROCEDURE — 77030031139 HC SUT VCRL2 J&J -A: Performed by: OBSTETRICS & GYNECOLOGY

## 2019-09-17 PROCEDURE — 77030018836 HC SOL IRR NACL ICUM -A: Performed by: OBSTETRICS & GYNECOLOGY

## 2019-09-17 PROCEDURE — 76060000032 HC ANESTHESIA 0.5 TO 1 HR: Performed by: OBSTETRICS & GYNECOLOGY

## 2019-09-17 PROCEDURE — 74011250636 HC RX REV CODE- 250/636

## 2019-09-17 PROCEDURE — 77030040361 HC SLV COMPR DVT MDII -B: Performed by: OBSTETRICS & GYNECOLOGY

## 2019-09-17 PROCEDURE — 77030012508 HC MSK AIRWY LMA AMBU -A: Performed by: ANESTHESIOLOGY

## 2019-09-17 PROCEDURE — 76210000021 HC REC RM PH II 0.5 TO 1 HR: Performed by: OBSTETRICS & GYNECOLOGY

## 2019-09-17 PROCEDURE — 74011000250 HC RX REV CODE- 250: Performed by: OBSTETRICS & GYNECOLOGY

## 2019-09-17 PROCEDURE — 74011000272 HC RX REV CODE- 272: Performed by: OBSTETRICS & GYNECOLOGY

## 2019-09-17 PROCEDURE — 76010000138 HC OR TIME 0.5 TO 1 HR: Performed by: OBSTETRICS & GYNECOLOGY

## 2019-09-17 PROCEDURE — 88305 TISSUE EXAM BY PATHOLOGIST: CPT

## 2019-09-17 PROCEDURE — 77030002933 HC SUT MCRYL J&J -A: Performed by: OBSTETRICS & GYNECOLOGY

## 2019-09-17 PROCEDURE — 74011250636 HC RX REV CODE- 250/636: Performed by: OBSTETRICS & GYNECOLOGY

## 2019-09-17 PROCEDURE — 77030003029 HC SUT VCRL J&J -B: Performed by: OBSTETRICS & GYNECOLOGY

## 2019-09-17 PROCEDURE — 76210000063 HC OR PH I REC FIRST 0.5 HR: Performed by: OBSTETRICS & GYNECOLOGY

## 2019-09-17 PROCEDURE — 77030020782 HC GWN BAIR PAWS FLX 3M -B: Performed by: OBSTETRICS & GYNECOLOGY

## 2019-09-17 PROCEDURE — 77030010507 HC ADH SKN DERMBND J&J -B: Performed by: OBSTETRICS & GYNECOLOGY

## 2019-09-17 PROCEDURE — 77030002996 HC SUT SLK J&J -A: Performed by: OBSTETRICS & GYNECOLOGY

## 2019-09-17 RX ORDER — SODIUM CHLORIDE 0.9 % (FLUSH) 0.9 %
5-40 SYRINGE (ML) INJECTION AS NEEDED
Status: DISCONTINUED | OUTPATIENT
Start: 2019-09-17 | End: 2019-09-17 | Stop reason: HOSPADM

## 2019-09-17 RX ORDER — EPHEDRINE SULFATE/0.9% NACL/PF 25 MG/5 ML
SYRINGE (ML) INTRAVENOUS AS NEEDED
Status: DISCONTINUED | OUTPATIENT
Start: 2019-09-17 | End: 2019-09-17 | Stop reason: HOSPADM

## 2019-09-17 RX ORDER — SODIUM CHLORIDE 0.9 % (FLUSH) 0.9 %
5-40 SYRINGE (ML) INJECTION EVERY 8 HOURS
Status: DISCONTINUED | OUTPATIENT
Start: 2019-09-17 | End: 2019-09-17 | Stop reason: HOSPADM

## 2019-09-17 RX ORDER — ONDANSETRON 2 MG/ML
INJECTION INTRAMUSCULAR; INTRAVENOUS AS NEEDED
Status: DISCONTINUED | OUTPATIENT
Start: 2019-09-17 | End: 2019-09-17 | Stop reason: HOSPADM

## 2019-09-17 RX ORDER — SODIUM CHLORIDE, SODIUM LACTATE, POTASSIUM CHLORIDE, CALCIUM CHLORIDE 600; 310; 30; 20 MG/100ML; MG/100ML; MG/100ML; MG/100ML
100 INJECTION, SOLUTION INTRAVENOUS CONTINUOUS
Status: DISCONTINUED | OUTPATIENT
Start: 2019-09-17 | End: 2019-09-17 | Stop reason: HOSPADM

## 2019-09-17 RX ORDER — SILVER NITRATE 38.21; 12.74 MG/1; MG/1
STICK TOPICAL AS NEEDED
Status: DISCONTINUED | OUTPATIENT
Start: 2019-09-17 | End: 2019-09-17 | Stop reason: HOSPADM

## 2019-09-17 RX ORDER — FENTANYL CITRATE 50 UG/ML
INJECTION, SOLUTION INTRAMUSCULAR; INTRAVENOUS AS NEEDED
Status: DISCONTINUED | OUTPATIENT
Start: 2019-09-17 | End: 2019-09-17 | Stop reason: HOSPADM

## 2019-09-17 RX ORDER — LIDOCAINE HYDROCHLORIDE 20 MG/ML
INJECTION, SOLUTION EPIDURAL; INFILTRATION; INTRACAUDAL; PERINEURAL AS NEEDED
Status: DISCONTINUED | OUTPATIENT
Start: 2019-09-17 | End: 2019-09-17 | Stop reason: HOSPADM

## 2019-09-17 RX ORDER — LIDOCAINE 50 MG/G
OINTMENT TOPICAL AS NEEDED
Status: DISCONTINUED | OUTPATIENT
Start: 2019-09-17 | End: 2019-09-17 | Stop reason: HOSPADM

## 2019-09-17 RX ORDER — PROPOFOL 10 MG/ML
INJECTION, EMULSION INTRAVENOUS AS NEEDED
Status: DISCONTINUED | OUTPATIENT
Start: 2019-09-17 | End: 2019-09-17 | Stop reason: HOSPADM

## 2019-09-17 RX ORDER — OXYCODONE AND ACETAMINOPHEN 5; 325 MG/1; MG/1
1 TABLET ORAL
Qty: 15 TAB | Refills: 0 | Status: SHIPPED | OUTPATIENT
Start: 2019-09-17 | End: 2019-09-22

## 2019-09-17 RX ORDER — FENTANYL CITRATE 50 UG/ML
25 INJECTION, SOLUTION INTRAMUSCULAR; INTRAVENOUS
Status: DISCONTINUED | OUTPATIENT
Start: 2019-09-17 | End: 2019-09-17 | Stop reason: HOSPADM

## 2019-09-17 RX ORDER — INSULIN LISPRO 100 [IU]/ML
INJECTION, SOLUTION INTRAVENOUS; SUBCUTANEOUS ONCE
Status: DISCONTINUED | OUTPATIENT
Start: 2019-09-17 | End: 2019-09-17 | Stop reason: HOSPADM

## 2019-09-17 RX ORDER — DEXTROSE MONOHYDRATE 100 MG/ML
125-250 INJECTION, SOLUTION INTRAVENOUS AS NEEDED
Status: DISCONTINUED | OUTPATIENT
Start: 2019-09-17 | End: 2019-09-17 | Stop reason: HOSPADM

## 2019-09-17 RX ORDER — MAGNESIUM SULFATE 100 %
4 CRYSTALS MISCELLANEOUS AS NEEDED
Status: DISCONTINUED | OUTPATIENT
Start: 2019-09-17 | End: 2019-09-17 | Stop reason: HOSPADM

## 2019-09-17 RX ORDER — MIDAZOLAM HYDROCHLORIDE 1 MG/ML
INJECTION, SOLUTION INTRAMUSCULAR; INTRAVENOUS AS NEEDED
Status: DISCONTINUED | OUTPATIENT
Start: 2019-09-17 | End: 2019-09-17 | Stop reason: HOSPADM

## 2019-09-17 RX ORDER — NALOXONE HYDROCHLORIDE 0.4 MG/ML
0.2 INJECTION, SOLUTION INTRAMUSCULAR; INTRAVENOUS; SUBCUTANEOUS AS NEEDED
Status: DISCONTINUED | OUTPATIENT
Start: 2019-09-17 | End: 2019-09-17 | Stop reason: HOSPADM

## 2019-09-17 RX ORDER — SCOLOPAMINE TRANSDERMAL SYSTEM 1 MG/1
1 PATCH, EXTENDED RELEASE TRANSDERMAL ONCE
Status: DISCONTINUED | OUTPATIENT
Start: 2019-09-17 | End: 2019-09-17

## 2019-09-17 RX ORDER — CLINDAMYCIN PHOSPHATE 900 MG/50ML
900 INJECTION, SOLUTION INTRAVENOUS ONCE
Status: COMPLETED | OUTPATIENT
Start: 2019-09-17 | End: 2019-09-17

## 2019-09-17 RX ORDER — SODIUM CHLORIDE, SODIUM LACTATE, POTASSIUM CHLORIDE, CALCIUM CHLORIDE 600; 310; 30; 20 MG/100ML; MG/100ML; MG/100ML; MG/100ML
125 INJECTION, SOLUTION INTRAVENOUS CONTINUOUS
Status: DISCONTINUED | OUTPATIENT
Start: 2019-09-17 | End: 2019-09-17 | Stop reason: HOSPADM

## 2019-09-17 RX ORDER — DEXAMETHASONE SODIUM PHOSPHATE 4 MG/ML
INJECTION, SOLUTION INTRA-ARTICULAR; INTRALESIONAL; INTRAMUSCULAR; INTRAVENOUS; SOFT TISSUE AS NEEDED
Status: DISCONTINUED | OUTPATIENT
Start: 2019-09-17 | End: 2019-09-17 | Stop reason: HOSPADM

## 2019-09-17 RX ADMIN — ONDANSETRON 4 MG: 2 INJECTION INTRAMUSCULAR; INTRAVENOUS at 07:59

## 2019-09-17 RX ADMIN — LIDOCAINE HYDROCHLORIDE 100 MG: 20 INJECTION, SOLUTION EPIDURAL; INFILTRATION; INTRACAUDAL; PERINEURAL at 07:51

## 2019-09-17 RX ADMIN — FENTANYL CITRATE 50 MCG: 50 INJECTION, SOLUTION INTRAMUSCULAR; INTRAVENOUS at 07:51

## 2019-09-17 RX ADMIN — Medication 10 MG: at 08:09

## 2019-09-17 RX ADMIN — PROPOFOL 170 MG: 10 INJECTION, EMULSION INTRAVENOUS at 07:51

## 2019-09-17 RX ADMIN — Medication 10 MG: at 07:54

## 2019-09-17 RX ADMIN — MIDAZOLAM HYDROCHLORIDE 2 MG: 1 INJECTION, SOLUTION INTRAMUSCULAR; INTRAVENOUS at 07:47

## 2019-09-17 RX ADMIN — CLINDAMYCIN PHOSPHATE 900 MG: 900 INJECTION, SOLUTION INTRAVENOUS at 07:54

## 2019-09-17 RX ADMIN — DEXAMETHASONE SODIUM PHOSPHATE 8 MG: 4 INJECTION, SOLUTION INTRA-ARTICULAR; INTRALESIONAL; INTRAMUSCULAR; INTRAVENOUS; SOFT TISSUE at 07:59

## 2019-09-17 RX ADMIN — SODIUM CHLORIDE, SODIUM LACTATE, POTASSIUM CHLORIDE, AND CALCIUM CHLORIDE 125 ML/HR: 600; 310; 30; 20 INJECTION, SOLUTION INTRAVENOUS at 06:25

## 2019-09-17 RX ADMIN — FENTANYL CITRATE 50 MCG: 50 INJECTION, SOLUTION INTRAMUSCULAR; INTRAVENOUS at 08:16

## 2019-09-17 RX ADMIN — SODIUM CHLORIDE, SODIUM LACTATE, POTASSIUM CHLORIDE, AND CALCIUM CHLORIDE: 600; 310; 30; 20 INJECTION, SOLUTION INTRAVENOUS at 08:24

## 2019-09-17 NOTE — INTERVAL H&P NOTE
H&P Update:  Julisa Palm was seen and examined. History and physical has been reviewed. The patient has been examined.  There have been no significant clinical changes since the completion of the originally dated History and Physical.    Bree Whitley MD

## 2019-09-17 NOTE — ANESTHESIA PREPROCEDURE EVALUATION
Relevant Problems   No relevant active problems       Anesthetic History        Pertinent negatives: No PONV  Comments: Patient denies PONV.      Review of Systems / Medical History  Patient summary reviewed, nursing notes reviewed and pertinent labs reviewed    Pulmonary            Asthma : well controlled       Neuro/Psych   Within defined limits           Cardiovascular  Within defined limits                     GI/Hepatic/Renal  Within defined limits              Endo/Other      Hypothyroidism: well controlled  Obesity and arthritis     Other Findings            Physical Exam    Airway  Mallampati: III  TM Distance: 4 - 6 cm  Neck ROM: normal range of motion   Mouth opening: Normal     Cardiovascular    Rhythm: regular  Rate: normal         Dental    Dentition: Lower partial plate     Pulmonary  Breath sounds clear to auscultation               Abdominal  GI exam deferred       Other Findings            Anesthetic Plan    ASA: 2  Anesthesia type: general          Induction: Intravenous  Anesthetic plan and risks discussed with: Patient and Family

## 2019-09-17 NOTE — ANESTHESIA POSTPROCEDURE EVALUATION
Procedure(s):  VULVAR BIOPSY  X 2  INTRAVENOUS MEDIPORT REMOVAL. general    Anesthesia Post Evaluation      Multimodal analgesia: multimodal analgesia used between 6 hours prior to anesthesia start to PACU discharge  Patient location during evaluation: bedside  Patient participation: complete - patient participated  Level of consciousness: awake and awake and alert  Pain management: adequate  Airway patency: patent  Anesthetic complications: no  Cardiovascular status: acceptable  Respiratory status: acceptable and room air  Hydration status: acceptable  Post anesthesia nausea and vomiting:  none      Vitals Value Taken Time   /48 9/17/2019  8:50 AM   Temp 36.3 °C (97.4 °F) 9/17/2019  8:46 AM   Pulse 81 9/17/2019  8:52 AM   Resp 16 9/17/2019  8:52 AM   SpO2 94 % 9/17/2019  8:52 AM   Vitals shown include unvalidated device data.

## 2019-09-17 NOTE — OP NOTES
Ottoniel Gut  1945  [id number]    Date of Procedure: 9/17/2019     Preoperative Diagnosis: ENDOMETRIAL CANCER, VULVAR LESIONS    Postoperative Diagnosis: ENDOMETRIAL CANCER, VULVAR LESIONS      Procedure(s):  VULVAR BIOPSY  X 2  INTRAVENOUS MEDIPORT REMOVAL    Surgeon(s) and Role:     * Alyson Mcgregor MD - Primary         Surgical Assistant: Sun Maria SA     Surgical Staff:  Circ-1: Nai Oconnell RN  Circ-2: Kandy Lugo RN  Scrub Tech-1: Sara Ti  Surg Asst-1: Brittne Lizeth    Event Time In Time Out   Incision Start 0804     Incision Close 0827        Anesthesia: General   Anesthesiologist: Micheal Mena MD  CRNA: Erik Satnana CRNA    Estimated Blood Loss: 5cc    Specimens:   ID Type Source Tests Collected by Time Destination   1 : Left vaginal introitus biopsy Preservative VULVA   Alyson Mcgregor MD 9/17/2019 9243 Pathology   2 : Right audi-clitoral biopsy Preservative VULVA   Alyson Mcgregor MD 9/17/2019 0815 Pathology      Findings: Normal left chest wall anatomy with Bard Power Port in place. Port and catheter removed, examined and completely intact. External female genitalia with atrophy, lichenification and findings suspicious for lichen sclerosus most prominent adjacent to the clitoris and at the ventral left introitus. Complications: none    Implants:   Implant Name Type Inv. Item Serial No.  Lot No. LRB No. Used Action   KIT INTERMED XPORT PWR 8FR --  - [id number]   KIT INTERMED XPORT PWR 8FR --    [employer] [id number] Left 1 Explanted        DESCRIPTION OF PROCEDURE:   After patient was accurately identified, consent was verified, signed on the chart and all questions were answered. The patient was taken to the operating room. She was placed in the dorsal lithotomy position with arms tucked. General anesthesia with LMA was used. The neck and chest was prepped in a normal fashion using Betadine.   0.5%  Marcain with epinephrine was injected into the skin at the previous incision. The scalpel was used to re-incise. Bovie cautery was used to incise the subcutaneous tissue to expose the mediport. The catheter was visualized, grasped with a hemostat and removed from the subclavian vein intact. Traction was placed on the port and each of the three 2-0 prolene sutures were entirely removed. The port was extracted and examined and verified to be intact. The capsule was removed from the port pocket with sharp dissection and Bovie cautery. The port pocket was closed with 3-0 vicryl suture. The skin was reapproximated with 4-0 Monocryl. Dermabond was placed as a dressing. Attention was then paid to the perineum which had been prepped with Betadine. 0.5%  Marcain with epinephrine was injected into the skin at intended biopsy sites. The skin in the most abnormal area at the left ventral vaginal introitus was elevated with amy forceps and the metzenbaum scissors were use to perform a shallow skin biospy. Pressure was applied for hemostasis. Silver Nitrate was also applied. The site was hemostatic. The skin in the most abnormal area adjacent to the clitoris on the right was elevated with amy forceps and the metzenbaum scissors were use to perform a shallow skin biospy. Pressure was applied for hemostasis. Silver Nitrate was also applied. The site was hemostatic. The perineum was copiously irrigated with sterile saline. A mixture of 5% Lidocaine ointment and Bacitracin was applied to each biopsy site. Patient was replaced in the supine position, awakened from anesthesia, and the LMA was removed. Patient was transferred to the stretcher and transported to the PACU awake an in stable condition. There were no complications with this procedure.      Katya Carpio MD  8:45 AM  09/17/19

## 2019-09-17 NOTE — DISCHARGE INSTRUCTIONS
DISCHARGE SUMMARY from Nurse    PATIENT INSTRUCTIONS:    After general anesthesia or intravenous sedation, for 24 hours or while taking prescription Narcotics:  · Limit your activities  · Do not drive and operate hazardous machinery  · Do not make important personal or business decisions  · Do  not drink alcoholic beverages  · If you have not urinated within 8 hours after discharge, please contact your surgeon on call. Report the following to your surgeon:  · Excessive pain, swelling, redness or odor of or around the surgical area  · Temperature over 100.5  · Nausea and vomiting lasting longer than 4 hours or if unable to take medications  · Any signs of decreased circulation or nerve impairment to extremity: change in color, persistent  numbness, tingling, coldness or increase pain  · Any questions    What to do at Home:  [facility] 1 WEEK CALL FOR APPT    If you experience any of the following symptoms heavy bleeding, fevers, severe pain, please follow up with dr Tory Manzano    *  Please give a list of your current medications to your Primary Care Provider. *  Please update this list whenever your medications are discontinued, doses are      changed, or new medications (including over-the-counter products) are added. *  Please carry medication information at all times in case of emergency situations. These are general instructions for a healthy lifestyle:    No smoking/ No tobacco products/ Avoid exposure to second hand smoke  Surgeon General's Warning:  Quitting smoking now greatly reduces serious risk to your health.     Obesity, smoking, and sedentary lifestyle greatly increases your risk for illness    A healthy diet, regular physical exercise & weight monitoring are important for maintaining a healthy lifestyle    You may be retaining fluid if you have a history of heart failure or if you experience any of the following symptoms:  Weight gain of 3 pounds or more overnight or 5 pounds in a week, increased swelling in our hands or feet or shortness of breath while lying flat in bed. Please call your doctor as soon as you notice any of these symptoms; do not wait until your next office visit. The discharge information has been reviewed with the patient and caregiver. The patient and caregiver verbalized understanding. Discharge medications reviewed with the patient and caregiver and appropriate educational materials and side effects teaching were provided.   ___________________________________________________________________________________________________________________________________    Patient armband removed and shredded

## 2019-09-17 NOTE — BRIEF OP NOTE
BRIEF OPERATIVE NOTE    Date of Procedure: 9/17/2019   Preoperative Diagnosis: ENDOMETRIAL CANCER, VULVAR LESIONS  Postoperative Diagnosis: ENDOMETRIAL CANCER, VULVAR LESIONS    Procedure(s):  VULVAR BIOPSY  X 2  INTRAVENOUS MEDIPORT REMOVAL  Surgeon(s) and Role:     * Alida Smith MD - Primary         Surgical Assistant: Roshni Vargas SA    Surgical Staff:  Circ-1: Morgan Butlre RN  Circ-2: Frankie Piña RN  Scrub Tech-1: Melissa Smith  Surg Asst-1: Pauljovanny Jamesmay  Event Time In Time Out   Incision Start 0804    Incision Close 0827      Anesthesia: General   Anesthesiologist: Morena Ansari MD  CRNA: Christian Dwyer CRNA  Estimated Blood Loss: 5cc  Specimens:   ID Type Source Tests Collected by Time Destination   1 : Left vaginal introitus biopsy Preservative VULVA  Alida Smith MD 9/17/2019 5264 Pathology   2 : Right audi-clitoral biopsy Preservative MELIDAVA  Alida Smith MD 9/17/2019 0815 Pathology      Findings: Normal left chest wall anatomy with Bard Power Port in place. Port and catheter removed, examined and completely intact. External female genitalia with atrophy, lichenification and findings suspicious for lichen sclerosus most prominent adjacent to the clitoris and at the ventral left introitus. Complications: none  Implants:   Implant Name Type Inv.  Item Serial No.  Lot No. LRB No. Used Action   KIT INTERMED [place]  8FR --  - [id number]  KIT INTERMED XPORT PWR 8FR --   [employer] [id number] Left 1 Explanted       Jean Paul Jensen MD

## 2019-09-24 ENCOUNTER — APPOINTMENT (OUTPATIENT)
Dept: INFUSION THERAPY | Age: 74
End: 2019-09-24

## 2019-10-22 ENCOUNTER — APPOINTMENT (OUTPATIENT)
Dept: INFUSION THERAPY | Age: 74
End: 2019-10-22

## 2019-10-29 ENCOUNTER — APPOINTMENT (OUTPATIENT)
Dept: INFUSION THERAPY | Age: 74
End: 2019-10-29

## 2019-12-03 ENCOUNTER — APPOINTMENT (OUTPATIENT)
Dept: INFUSION THERAPY | Age: 74
End: 2019-12-03

## 2019-12-10 ENCOUNTER — APPOINTMENT (OUTPATIENT)
Dept: INFUSION THERAPY | Age: 74
End: 2019-12-10
Payer: MEDICARE
